# Patient Record
Sex: MALE | Race: WHITE | Employment: OTHER
[De-identification: names, ages, dates, MRNs, and addresses within clinical notes are randomized per-mention and may not be internally consistent; named-entity substitution may affect disease eponyms.]

---

## 2017-02-17 ENCOUNTER — OFFICE VISIT (OUTPATIENT)
Dept: CARDIOLOGY | Facility: CLINIC | Age: 62
End: 2017-02-17

## 2017-02-17 VITALS
BODY MASS INDEX: 35.67 KG/M2 | DIASTOLIC BLOOD PRESSURE: 80 MMHG | SYSTOLIC BLOOD PRESSURE: 150 MMHG | OXYGEN SATURATION: 96 % | HEART RATE: 82 BPM | WEIGHT: 263 LBS

## 2017-02-17 DIAGNOSIS — E55.9 VITAMIN D DEFICIENCY DISEASE: ICD-10-CM

## 2017-02-17 DIAGNOSIS — E13.9 DIABETES MELLITUS OF OTHER TYPE WITHOUT COMPLICATION: ICD-10-CM

## 2017-02-17 DIAGNOSIS — I10 ESSENTIAL HYPERTENSION: ICD-10-CM

## 2017-02-17 DIAGNOSIS — I48.20 CHRONIC ATRIAL FIBRILLATION (HCC): Primary | ICD-10-CM

## 2017-02-17 PROCEDURE — 99214 OFFICE O/P EST MOD 30 MIN: CPT | Performed by: INTERNAL MEDICINE

## 2017-02-17 RX ORDER — METOPROLOL TARTRATE 100 MG/1
50 TABLET ORAL 2 TIMES DAILY
Qty: 60 TABLET | Refills: 11 | Status: SHIPPED | OUTPATIENT
Start: 2017-02-17 | End: 2017-06-01

## 2017-05-26 RX ORDER — SPIRONOLACTONE 25 MG/1
25 TABLET ORAL PRN
Qty: 30 TABLET | Refills: 11 | Status: SHIPPED | OUTPATIENT
Start: 2017-05-26 | End: 2017-06-01 | Stop reason: SDUPTHER

## 2017-06-01 ENCOUNTER — OFFICE VISIT (OUTPATIENT)
Dept: CARDIOLOGY CLINIC | Age: 62
End: 2017-06-01
Payer: COMMERCIAL

## 2017-06-01 ENCOUNTER — HOSPITAL ENCOUNTER (OUTPATIENT)
Age: 62
Discharge: HOME | End: 2017-06-01
Payer: COMMERCIAL

## 2017-06-01 VITALS
BODY MASS INDEX: 36.62 KG/M2 | OXYGEN SATURATION: 98 % | DIASTOLIC BLOOD PRESSURE: 90 MMHG | WEIGHT: 270 LBS | HEART RATE: 120 BPM | SYSTOLIC BLOOD PRESSURE: 150 MMHG

## 2017-06-01 DIAGNOSIS — R51.9 FREQUENT HEADACHES: ICD-10-CM

## 2017-06-01 DIAGNOSIS — I10 ESSENTIAL HYPERTENSION: ICD-10-CM

## 2017-06-01 DIAGNOSIS — D50.9 IRON DEFICIENCY ANEMIA, UNSPECIFIED IRON DEFICIENCY ANEMIA TYPE: ICD-10-CM

## 2017-06-01 DIAGNOSIS — E78.5 HYPERLIPIDEMIA, UNSPECIFIED HYPERLIPIDEMIA TYPE: ICD-10-CM

## 2017-06-01 DIAGNOSIS — I48.20 CHRONIC ATRIAL FIBRILLATION (HCC): Primary | ICD-10-CM

## 2017-06-01 DIAGNOSIS — E13.9 DIABETES MELLITUS OF OTHER TYPE WITHOUT COMPLICATION: ICD-10-CM

## 2017-06-01 DIAGNOSIS — I48.20 CHRONIC ATRIAL FIBRILLATION (HCC): ICD-10-CM

## 2017-06-01 LAB
ABSOLUTE EOS #: 0.12 K/UL (ref 0–0.4)
ABSOLUTE LYMPH #: 0.84 K/UL (ref 0.9–2.5)
ABSOLUTE MONO #: 0.42 K/UL (ref 0–1)
ABSOLUTE RETIC #: NORMAL M/UL (ref 0.02–0.12)
BASOPHILS # BLD: ABNORMAL %
BASOPHILS ABSOLUTE: ABNORMAL K/UL (ref 0–0.2)
CELLS COUNTED: 50
DIFFERENTIAL TYPE: ABNORMAL
EOSINOPHILS RELATIVE PERCENT: 4 %
HCT VFR BLD CALC: 29.8 % (ref 41–53)
HEMOGLOBIN: 9.1 G/DL (ref 13.5–17.5)
IRON SATURATION: 4 % (ref 20–55)
IRON: 13 UG/DL (ref 59–158)
LYMPHOCYTES # BLD: 28 %
MCH RBC QN AUTO: 25.1 PG (ref 26–34)
MCHC RBC AUTO-ENTMCNC: 30.7 G/DL (ref 31–37)
MCV RBC AUTO: 81.8 FL (ref 80–100)
METAMYELOCYTES ABSOLUTE COUNT: 0.06 K/UL
METAMYELOCYTES: 2 %
MONOCYTES # BLD: 14 %
MORPHOLOGY: ABNORMAL
PDW BLD-RTO: 18.7 % (ref 12.1–15.2)
PLATELET # BLD: 215 K/UL (ref 140–450)
PLATELET ESTIMATE: ABNORMAL
PMV BLD AUTO: ABNORMAL FL (ref 6–12)
RBC # BLD: 3.64 M/UL (ref 4.5–5.9)
RBC # BLD: ABNORMAL 10*6/UL
RETIC %: 2 % (ref 0.5–2)
SEG NEUTROPHILS: 52 %
SEGMENTED NEUTROPHILS ABSOLUTE COUNT: 1.56 K/UL (ref 2.1–6.5)
TOTAL IRON BINDING CAPACITY: 346 UG/DL (ref 250–450)
UNSATURATED IRON BINDING CAPACITY: 333 UG/DL (ref 112–347)
WBC # BLD: 3 K/UL (ref 3.5–11)
WBC # BLD: ABNORMAL 10*3/UL

## 2017-06-01 PROCEDURE — 99214 OFFICE O/P EST MOD 30 MIN: CPT | Performed by: INTERNAL MEDICINE

## 2017-06-01 PROCEDURE — 83540 ASSAY OF IRON: CPT

## 2017-06-01 PROCEDURE — 83550 IRON BINDING TEST: CPT

## 2017-06-01 PROCEDURE — 85045 AUTOMATED RETICULOCYTE COUNT: CPT

## 2017-06-01 PROCEDURE — 85025 COMPLETE CBC W/AUTO DIFF WBC: CPT

## 2017-06-01 PROCEDURE — 36415 COLL VENOUS BLD VENIPUNCTURE: CPT

## 2017-06-01 RX ORDER — FUROSEMIDE 20 MG/1
20 TABLET ORAL 2 TIMES DAILY
Qty: 60 TABLET | Refills: 3 | Status: SHIPPED | OUTPATIENT
Start: 2017-06-01 | End: 2017-06-01 | Stop reason: CLARIF

## 2017-06-01 RX ORDER — FERROUS SULFATE 325(65) MG
325 TABLET ORAL
COMMUNITY
End: 2018-02-16 | Stop reason: ALTCHOICE

## 2017-06-01 RX ORDER — SPIRONOLACTONE 25 MG/1
25 TABLET ORAL 2 TIMES DAILY
Qty: 60 TABLET | Refills: 11 | Status: SHIPPED | OUTPATIENT
Start: 2017-06-01 | End: 2018-08-13 | Stop reason: SDUPTHER

## 2017-06-01 RX ORDER — FUROSEMIDE 20 MG/1
20 TABLET ORAL 2 TIMES DAILY
COMMUNITY

## 2017-06-01 RX ORDER — DILTIAZEM HYDROCHLORIDE 180 MG/1
180 CAPSULE, COATED, EXTENDED RELEASE ORAL 2 TIMES DAILY
Qty: 30 CAPSULE | Refills: 11 | Status: SHIPPED | OUTPATIENT
Start: 2017-06-01 | End: 2018-03-21 | Stop reason: SDUPTHER

## 2017-06-08 ENCOUNTER — HOSPITAL ENCOUNTER (OUTPATIENT)
Dept: NUCLEAR MEDICINE | Age: 62
Discharge: HOME | End: 2017-06-08
Payer: COMMERCIAL

## 2017-06-08 ENCOUNTER — HOSPITAL ENCOUNTER (OUTPATIENT)
Dept: NON INVASIVE DIAGNOSTICS | Age: 62
Discharge: HOME | End: 2017-06-08
Payer: COMMERCIAL

## 2017-06-08 ENCOUNTER — HOSPITAL ENCOUNTER (OUTPATIENT)
Age: 62
Discharge: HOME | End: 2017-06-08
Payer: COMMERCIAL

## 2017-06-08 ENCOUNTER — HOSPITAL ENCOUNTER (OUTPATIENT)
Dept: CT IMAGING | Age: 62
Discharge: HOME | End: 2017-06-08
Payer: COMMERCIAL

## 2017-06-08 VITALS — SYSTOLIC BLOOD PRESSURE: 164 MMHG | DIASTOLIC BLOOD PRESSURE: 83 MMHG | HEART RATE: 82 BPM

## 2017-06-08 DIAGNOSIS — I48.20 CHRONIC ATRIAL FIBRILLATION (HCC): ICD-10-CM

## 2017-06-08 DIAGNOSIS — E13.9 DIABETES MELLITUS OF OTHER TYPE WITHOUT COMPLICATION: ICD-10-CM

## 2017-06-08 DIAGNOSIS — I10 ESSENTIAL HYPERTENSION: ICD-10-CM

## 2017-06-08 DIAGNOSIS — E78.5 HYPERLIPIDEMIA, UNSPECIFIED HYPERLIPIDEMIA TYPE: ICD-10-CM

## 2017-06-08 DIAGNOSIS — R51.9 FREQUENT HEADACHES: ICD-10-CM

## 2017-06-08 LAB
ABSOLUTE EOS #: 0.1 K/UL (ref 0–0.4)
ABSOLUTE LYMPH #: 0.9 K/UL (ref 0.9–2.5)
ABSOLUTE MONO #: 0.4 K/UL (ref 0–1)
ALBUMIN SERPL-MCNC: 3.9 G/DL (ref 3.5–5.2)
ALBUMIN/GLOBULIN RATIO: ABNORMAL (ref 1–2.5)
ALP BLD-CCNC: 100 U/L (ref 40–129)
ALT SERPL-CCNC: 23 U/L (ref 5–41)
ANION GAP SERPL CALCULATED.3IONS-SCNC: 12 MMOL/L (ref 9–17)
AST SERPL-CCNC: 38 U/L
BASOPHILS # BLD: 0 %
BASOPHILS ABSOLUTE: 0 K/UL (ref 0–0.2)
BILIRUB SERPL-MCNC: 0.69 MG/DL (ref 0.3–1.2)
BUN BLDV-MCNC: 11 MG/DL (ref 6–20)
BUN/CREAT BLD: 12 (ref 9–20)
CALCIUM SERPL-MCNC: 9 MG/DL (ref 8.6–10.4)
CHLORIDE BLD-SCNC: 95 MMOL/L (ref 98–107)
CHOLESTEROL/HDL RATIO: 1.9
CHOLESTEROL: 119 MG/DL
CO2: 26 MMOL/L (ref 20–31)
CREAT SERPL-MCNC: 0.89 MG/DL (ref 0.7–1.2)
DIFFERENTIAL TYPE: ABNORMAL
EOSINOPHILS RELATIVE PERCENT: 3 %
GFR AFRICAN AMERICAN: >60 ML/MIN
GFR NON-AFRICAN AMERICAN: >60 ML/MIN
GFR SERPL CREATININE-BSD FRML MDRD: ABNORMAL ML/MIN/{1.73_M2}
GFR SERPL CREATININE-BSD FRML MDRD: ABNORMAL ML/MIN/{1.73_M2}
GLUCOSE BLD-MCNC: 111 MG/DL (ref 70–99)
HCT VFR BLD CALC: 28.8 % (ref 41–53)
HDLC SERPL-MCNC: 63 MG/DL
HEMOGLOBIN: 8.9 G/DL (ref 13.5–17.5)
LDL CHOLESTEROL: 44 MG/DL (ref 0–130)
LV EF: 50 %
LVEF MODALITY: NORMAL
LYMPHOCYTES # BLD: 23 %
MCH RBC QN AUTO: 24.9 PG (ref 26–34)
MCHC RBC AUTO-ENTMCNC: 31 G/DL (ref 31–37)
MCV RBC AUTO: 80.4 FL (ref 80–100)
MONOCYTES # BLD: 10 %
PATIENT FASTING?: YES
PDW BLD-RTO: 17.8 % (ref 12.1–15.2)
PLATELET # BLD: 257 K/UL (ref 140–450)
PLATELET ESTIMATE: ABNORMAL
PMV BLD AUTO: ABNORMAL FL (ref 6–12)
POTASSIUM SERPL-SCNC: 5.3 MMOL/L (ref 3.7–5.3)
RBC # BLD: 3.59 M/UL (ref 4.5–5.9)
RBC # BLD: ABNORMAL 10*6/UL
SEG NEUTROPHILS: 64 %
SEGMENTED NEUTROPHILS ABSOLUTE COUNT: 2.4 K/UL (ref 2.1–6.5)
SODIUM BLD-SCNC: 133 MMOL/L (ref 135–144)
TOTAL PROTEIN: 7.2 G/DL (ref 6.4–8.3)
TRIGL SERPL-MCNC: 58 MG/DL
VLDLC SERPL CALC-MCNC: NORMAL MG/DL (ref 1–30)
WBC # BLD: 3.7 K/UL (ref 3.5–11)
WBC # BLD: ABNORMAL 10*3/UL

## 2017-06-08 PROCEDURE — 6360000002 HC RX W HCPCS: Performed by: INTERNAL MEDICINE

## 2017-06-08 PROCEDURE — 93306 TTE W/DOPPLER COMPLETE: CPT

## 2017-06-08 PROCEDURE — 80061 LIPID PANEL: CPT

## 2017-06-08 PROCEDURE — A9500 TC99M SESTAMIBI: HCPCS | Performed by: INTERNAL MEDICINE

## 2017-06-08 PROCEDURE — 70450 CT HEAD/BRAIN W/O DYE: CPT

## 2017-06-08 PROCEDURE — 93005 ELECTROCARDIOGRAM TRACING: CPT

## 2017-06-08 PROCEDURE — 36415 COLL VENOUS BLD VENIPUNCTURE: CPT

## 2017-06-08 PROCEDURE — 2580000003 HC RX 258: Performed by: INTERNAL MEDICINE

## 2017-06-08 PROCEDURE — 78452 HT MUSCLE IMAGE SPECT MULT: CPT

## 2017-06-08 PROCEDURE — 3430000000 HC RX DIAGNOSTIC RADIOPHARMACEUTICAL: Performed by: INTERNAL MEDICINE

## 2017-06-08 PROCEDURE — 80053 COMPREHEN METABOLIC PANEL: CPT

## 2017-06-08 PROCEDURE — 93017 CV STRESS TEST TRACING ONLY: CPT

## 2017-06-08 PROCEDURE — 85025 COMPLETE CBC W/AUTO DIFF WBC: CPT

## 2017-06-08 RX ORDER — AMINOPHYLLINE DIHYDRATE 25 MG/ML
100 INJECTION, SOLUTION INTRAVENOUS
Status: ACTIVE | OUTPATIENT
Start: 2017-06-08 | End: 2017-06-08

## 2017-06-08 RX ORDER — AMINOPHYLLINE DIHYDRATE 25 MG/ML
50 INJECTION, SOLUTION INTRAVENOUS
Status: ACTIVE | OUTPATIENT
Start: 2017-06-08 | End: 2017-06-08

## 2017-06-08 RX ORDER — 0.9 % SODIUM CHLORIDE 0.9 %
10 VIAL (ML) INJECTION PRN
Status: DISCONTINUED | OUTPATIENT
Start: 2017-06-08 | End: 2017-06-09 | Stop reason: HOSPADM

## 2017-06-08 RX ADMIN — TETRAKIS(2-METHOXYISOBUTYLISOCYANIDE)COPPER(I) TETRAFLUOROBORATE 31.3 MILLICURIE: 1 INJECTION, POWDER, LYOPHILIZED, FOR SOLUTION INTRAVENOUS at 11:13

## 2017-06-08 RX ADMIN — REGADENOSON 0.4 MG: 0.08 INJECTION, SOLUTION INTRAVENOUS at 11:13

## 2017-06-08 RX ADMIN — Medication 10 ML: at 11:13

## 2017-06-08 RX ADMIN — TETRAKIS(2-METHOXYISOBUTYLISOCYANIDE)COPPER(I) TETRAFLUOROBORATE 10.8 MILLICURIE: 1 INJECTION, POWDER, LYOPHILIZED, FOR SOLUTION INTRAVENOUS at 10:10

## 2017-06-12 ENCOUNTER — OFFICE VISIT (OUTPATIENT)
Dept: CARDIOLOGY CLINIC | Age: 62
End: 2017-06-12
Payer: COMMERCIAL

## 2017-06-12 VITALS
BODY MASS INDEX: 36.62 KG/M2 | SYSTOLIC BLOOD PRESSURE: 130 MMHG | DIASTOLIC BLOOD PRESSURE: 70 MMHG | HEART RATE: 100 BPM | WEIGHT: 270 LBS | OXYGEN SATURATION: 99 %

## 2017-06-12 DIAGNOSIS — D50.9 IRON DEFICIENCY ANEMIA, UNSPECIFIED IRON DEFICIENCY ANEMIA TYPE: ICD-10-CM

## 2017-06-12 DIAGNOSIS — I48.20 CHRONIC ATRIAL FIBRILLATION (HCC): Primary | ICD-10-CM

## 2017-06-12 DIAGNOSIS — I10 ESSENTIAL HYPERTENSION: ICD-10-CM

## 2017-06-12 DIAGNOSIS — E55.9 VITAMIN D DEFICIENCY DISEASE: ICD-10-CM

## 2017-06-12 LAB
EKG ATRIAL RATE: 91 BPM
EKG Q-T INTERVAL: 366 MS
EKG QRS DURATION: 94 MS
EKG QTC CALCULATION (BAZETT): 432 MS
EKG R AXIS: 54 DEGREES
EKG T AXIS: -60 DEGREES
EKG VENTRICULAR RATE: 84 BPM

## 2017-06-12 PROCEDURE — 99213 OFFICE O/P EST LOW 20 MIN: CPT | Performed by: INTERNAL MEDICINE

## 2017-06-12 RX ORDER — ISOSORBIDE MONONITRATE 30 MG/1
15 TABLET, EXTENDED RELEASE ORAL DAILY
Qty: 30 TABLET | Refills: 11 | Status: SHIPPED | OUTPATIENT
Start: 2017-06-12 | End: 2018-02-16 | Stop reason: ALTCHOICE

## 2017-06-12 RX ORDER — PREDNISONE 20 MG/1
20 TABLET ORAL DAILY
Qty: 6 TABLET | Refills: 0 | Status: SHIPPED | OUTPATIENT
Start: 2017-06-12 | End: 2017-06-14

## 2017-06-13 ENCOUNTER — HOSPITAL ENCOUNTER (OUTPATIENT)
Age: 62
Discharge: HOME | End: 2017-06-13
Payer: COMMERCIAL

## 2017-06-13 DIAGNOSIS — I48.20 CHRONIC ATRIAL FIBRILLATION (HCC): ICD-10-CM

## 2017-06-13 DIAGNOSIS — D64.9 LOW HEMOGLOBIN: Primary | ICD-10-CM

## 2017-06-13 DIAGNOSIS — D50.9 IRON DEFICIENCY ANEMIA, UNSPECIFIED IRON DEFICIENCY ANEMIA TYPE: ICD-10-CM

## 2017-06-13 DIAGNOSIS — I10 ESSENTIAL HYPERTENSION: ICD-10-CM

## 2017-06-13 DIAGNOSIS — D50.9 IRON DEFICIENCY ANEMIA, UNSPECIFIED IRON DEFICIENCY ANEMIA TYPE: Primary | ICD-10-CM

## 2017-06-13 DIAGNOSIS — E55.9 VITAMIN D DEFICIENCY DISEASE: ICD-10-CM

## 2017-06-13 DIAGNOSIS — E13.9 DIABETES MELLITUS OF OTHER TYPE WITHOUT COMPLICATION: ICD-10-CM

## 2017-06-13 LAB
BILIRUBIN URINE: NEGATIVE
CHOLESTEROL/HDL RATIO: 2
CHOLESTEROL: 119 MG/DL
COLOR: YELLOW
COMMENT UA: NORMAL
FOLATE: 6.4 NG/ML
GLUCOSE URINE: NEGATIVE
HCT VFR BLD CALC: 29.9 % (ref 41–53)
HDLC SERPL-MCNC: 60 MG/DL
HEMOGLOBIN: 9.4 G/DL (ref 13.5–17.5)
IRON SATURATION: 5 % (ref 20–55)
IRON: 17 UG/DL (ref 59–158)
KETONES, URINE: NEGATIVE
LDL CHOLESTEROL: 47 MG/DL (ref 0–130)
LEUKOCYTE ESTERASE, URINE: NEGATIVE
NITRITE, URINE: NEGATIVE
PATIENT FASTING?: YES
PH UA: 6 (ref 5–8)
PROTEIN UA: NEGATIVE
SPECIFIC GRAVITY UA: 1.01 (ref 1–1.03)
TOTAL IRON BINDING CAPACITY: 338 UG/DL (ref 250–450)
TRIGL SERPL-MCNC: 60 MG/DL
TURBIDITY: CLEAR
UNSATURATED IRON BINDING CAPACITY: 321 UG/DL (ref 112–347)
URINE HGB: NEGATIVE
UROBILINOGEN, URINE: NORMAL
VITAMIN B-12: 361 PG/ML (ref 211–946)
VLDLC SERPL CALC-MCNC: NORMAL MG/DL (ref 1–30)

## 2017-06-13 PROCEDURE — 80061 LIPID PANEL: CPT

## 2017-06-13 PROCEDURE — 82607 VITAMIN B-12: CPT

## 2017-06-13 PROCEDURE — 83540 ASSAY OF IRON: CPT

## 2017-06-13 PROCEDURE — 82746 ASSAY OF FOLIC ACID SERUM: CPT

## 2017-06-13 PROCEDURE — 36415 COLL VENOUS BLD VENIPUNCTURE: CPT

## 2017-06-13 PROCEDURE — 85014 HEMATOCRIT: CPT

## 2017-06-13 PROCEDURE — 86255 FLUORESCENT ANTIBODY SCREEN: CPT

## 2017-06-13 PROCEDURE — 85018 HEMOGLOBIN: CPT

## 2017-06-13 PROCEDURE — 81003 URINALYSIS AUTO W/O SCOPE: CPT

## 2017-06-13 PROCEDURE — 83550 IRON BINDING TEST: CPT

## 2017-06-14 ENCOUNTER — TELEPHONE (OUTPATIENT)
Dept: CARDIOLOGY CLINIC | Age: 62
End: 2017-06-14

## 2017-06-15 ENCOUNTER — HOSPITAL ENCOUNTER (OUTPATIENT)
Age: 62
Setting detail: SPECIMEN
Discharge: HOME | End: 2017-06-15
Payer: COMMERCIAL

## 2017-06-15 DIAGNOSIS — D64.9 LOW HEMOGLOBIN: ICD-10-CM

## 2017-06-15 LAB
DATE, STOOL #1: NORMAL
DATE, STOOL #2: NORMAL
DATE, STOOL #3: NORMAL
HEMOCCULT SP1 STL QL: NEGATIVE
HEMOCCULT SP2 STL QL: NORMAL
HEMOCCULT SP3 STL QL: NORMAL
Lab: NORMAL
Lab: NORMAL
TIME, STOOL #1: 1045
TIME, STOOL #2: NORMAL
TIME, STOOL #3: NORMAL

## 2017-06-15 PROCEDURE — 82272 OCCULT BLD FECES 1-3 TESTS: CPT

## 2017-06-21 ENCOUNTER — NURSE ONLY (OUTPATIENT)
Dept: CARDIOLOGY CLINIC | Age: 62
End: 2017-06-21

## 2017-06-21 DIAGNOSIS — I20.9 ANGINA, CLASS III (HCC): Primary | ICD-10-CM

## 2017-06-22 ENCOUNTER — TELEPHONE (OUTPATIENT)
Dept: CARDIOLOGY CLINIC | Age: 62
End: 2017-06-22

## 2017-06-23 ENCOUNTER — HOSPITAL ENCOUNTER (OUTPATIENT)
Age: 62
Discharge: HOME | End: 2017-06-23
Payer: COMMERCIAL

## 2017-06-23 ENCOUNTER — TELEPHONE (OUTPATIENT)
Dept: CARDIOLOGY CLINIC | Age: 62
End: 2017-06-23

## 2017-06-23 DIAGNOSIS — I10 ESSENTIAL HYPERTENSION: ICD-10-CM

## 2017-06-23 LAB
BUN BLDV-MCNC: 12 MG/DL (ref 6–20)
CREAT SERPL-MCNC: 0.79 MG/DL (ref 0.7–1.2)
GFR AFRICAN AMERICAN: >60 ML/MIN
GFR NON-AFRICAN AMERICAN: >60 ML/MIN
GFR SERPL CREATININE-BSD FRML MDRD: NORMAL ML/MIN/{1.73_M2}
GFR SERPL CREATININE-BSD FRML MDRD: NORMAL ML/MIN/{1.73_M2}

## 2017-06-23 PROCEDURE — 84520 ASSAY OF UREA NITROGEN: CPT

## 2017-06-23 PROCEDURE — 82565 ASSAY OF CREATININE: CPT

## 2017-06-23 PROCEDURE — 36415 COLL VENOUS BLD VENIPUNCTURE: CPT

## 2017-06-26 ENCOUNTER — HOSPITAL ENCOUNTER (OUTPATIENT)
Dept: ULTRASOUND IMAGING | Age: 62
Discharge: HOME | End: 2017-06-26
Payer: COMMERCIAL

## 2017-06-26 ENCOUNTER — NURSE ONLY (OUTPATIENT)
Dept: CARDIOLOGY CLINIC | Age: 62
End: 2017-06-26

## 2017-06-26 DIAGNOSIS — I72.9 PSEUDOANEURYSM (HCC): Primary | ICD-10-CM

## 2017-06-26 DIAGNOSIS — I72.9 PSEUDOANEURYSM (HCC): ICD-10-CM

## 2017-06-26 PROCEDURE — 76881 US COMPL JOINT R-T W/IMG: CPT

## 2017-07-11 ENCOUNTER — HOSPITAL ENCOUNTER (OUTPATIENT)
Dept: CARDIAC REHAB | Age: 62
Setting detail: THERAPIES SERIES
Discharge: HOME | End: 2017-07-11
Payer: COMMERCIAL

## 2017-07-11 VITALS — HEIGHT: 72 IN

## 2017-07-12 ENCOUNTER — HOSPITAL ENCOUNTER (OUTPATIENT)
Dept: CARDIAC REHAB | Age: 62
Setting detail: THERAPIES SERIES
Discharge: HOME | End: 2017-07-12
Payer: COMMERCIAL

## 2017-07-12 VITALS — HEIGHT: 72 IN | BODY MASS INDEX: 37.71 KG/M2 | WEIGHT: 278.4 LBS

## 2017-07-12 PROCEDURE — 93798 PHYS/QHP OP CAR RHAB W/ECG: CPT

## 2017-07-19 ENCOUNTER — HOSPITAL ENCOUNTER (OUTPATIENT)
Dept: CARDIAC REHAB | Age: 62
Setting detail: THERAPIES SERIES
Discharge: HOME | End: 2017-07-19
Payer: COMMERCIAL

## 2017-07-24 ENCOUNTER — HOSPITAL ENCOUNTER (OUTPATIENT)
Dept: CARDIAC REHAB | Age: 62
Discharge: HOME | End: 2017-07-24

## 2017-07-26 ENCOUNTER — HOSPITAL ENCOUNTER (OUTPATIENT)
Dept: CARDIAC REHAB | Age: 62
Setting detail: THERAPIES SERIES
Discharge: HOME | End: 2017-07-26
Payer: COMMERCIAL

## 2017-07-28 ENCOUNTER — HOSPITAL ENCOUNTER (OUTPATIENT)
Dept: CARDIAC REHAB | Age: 62
Setting detail: THERAPIES SERIES
Discharge: HOME | End: 2017-07-28
Payer: COMMERCIAL

## 2017-07-31 ENCOUNTER — HOSPITAL ENCOUNTER (OUTPATIENT)
Dept: CARDIAC REHAB | Age: 62
Setting detail: THERAPIES SERIES
Discharge: HOME | End: 2017-07-31
Payer: COMMERCIAL

## 2017-08-03 ENCOUNTER — HOSPITAL ENCOUNTER (OUTPATIENT)
Dept: CARDIAC REHAB | Age: 62
Setting detail: THERAPIES SERIES
Discharge: HOME | End: 2017-08-03
Payer: COMMERCIAL

## 2017-10-19 DIAGNOSIS — D50.9 IRON DEFICIENCY ANEMIA, UNSPECIFIED IRON DEFICIENCY ANEMIA TYPE: Primary | ICD-10-CM

## 2017-10-21 ENCOUNTER — HOSPITAL ENCOUNTER (OUTPATIENT)
Age: 62
Discharge: HOME | End: 2017-10-21
Payer: COMMERCIAL

## 2017-10-21 DIAGNOSIS — D50.9 IRON DEFICIENCY ANEMIA, UNSPECIFIED IRON DEFICIENCY ANEMIA TYPE: ICD-10-CM

## 2017-10-21 LAB
ABSOLUTE EOS #: 0.13 K/UL (ref 0–0.4)
ABSOLUTE IMMATURE GRANULOCYTE: ABNORMAL K/UL (ref 0–0.3)
ABSOLUTE LYMPH #: 1.02 K/UL (ref 1–4.8)
ABSOLUTE MONO #: 0.36 K/UL (ref 0–1)
ABSOLUTE RETIC #: 0.1 M/UL (ref 0.02–0.12)
BASOPHILS # BLD: ABNORMAL %
BASOPHILS ABSOLUTE: ABNORMAL K/UL (ref 0–0.2)
DIFFERENTIAL TYPE: ABNORMAL
EOSINOPHILS RELATIVE PERCENT: 4 %
FOLATE: 4.6 NG/ML
HCT VFR BLD CALC: 26.8 % (ref 41–53)
HEMOGLOBIN: 8.1 G/DL (ref 13.5–17.5)
IMMATURE GRANULOCYTES: ABNORMAL %
IRON SATURATION: 7 % (ref 20–55)
IRON: 24 UG/DL (ref 59–158)
LYMPHOCYTES # BLD: 31 %
MCH RBC QN AUTO: 22.1 PG (ref 26–34)
MCHC RBC AUTO-ENTMCNC: 30.2 G/DL (ref 31–37)
MCV RBC AUTO: 73.3 FL (ref 80–100)
MONOCYTES # BLD: 11 %
MORPHOLOGY: ABNORMAL
PDW BLD-RTO: 21.3 % (ref 12.1–15.2)
PLATELET # BLD: 192 K/UL (ref 140–450)
PLATELET ESTIMATE: ABNORMAL
PMV BLD AUTO: ABNORMAL FL (ref 6–12)
RBC # BLD: 3.66 M/UL (ref 4.5–5.9)
RBC # BLD: ABNORMAL 10*6/UL
RETIC %: 2.7 % (ref 0.5–2)
RETIC HEMOGLOBIN: ABNORMAL PG (ref 28.2–35.7)
SEG NEUTROPHILS: 54 %
SEGMENTED NEUTROPHILS ABSOLUTE COUNT: 1.79 K/UL (ref 2.1–6.5)
TOTAL IRON BINDING CAPACITY: 334 UG/DL (ref 250–450)
UNSATURATED IRON BINDING CAPACITY: 310 UG/DL (ref 112–347)
VITAMIN B-12: 367 PG/ML (ref 211–946)
WBC # BLD: 3.3 K/UL (ref 3.5–11)
WBC # BLD: ABNORMAL 10*3/UL

## 2017-10-21 PROCEDURE — 36415 COLL VENOUS BLD VENIPUNCTURE: CPT

## 2017-10-21 PROCEDURE — 82746 ASSAY OF FOLIC ACID SERUM: CPT

## 2017-10-21 PROCEDURE — 85045 AUTOMATED RETICULOCYTE COUNT: CPT

## 2017-10-21 PROCEDURE — 85025 COMPLETE CBC W/AUTO DIFF WBC: CPT

## 2017-10-21 PROCEDURE — 83550 IRON BINDING TEST: CPT

## 2017-10-21 PROCEDURE — 82607 VITAMIN B-12: CPT

## 2017-10-21 PROCEDURE — 83540 ASSAY OF IRON: CPT

## 2017-10-24 ENCOUNTER — TELEPHONE (OUTPATIENT)
Dept: CARDIOLOGY CLINIC | Age: 62
End: 2017-10-24

## 2017-10-24 DIAGNOSIS — D50.9 IRON DEFICIENCY ANEMIA, UNSPECIFIED IRON DEFICIENCY ANEMIA TYPE: Primary | ICD-10-CM

## 2017-12-02 ENCOUNTER — HOSPITAL ENCOUNTER (OUTPATIENT)
Age: 62
Discharge: HOME | End: 2017-12-02
Payer: COMMERCIAL

## 2017-12-02 DIAGNOSIS — D50.9 IRON DEFICIENCY ANEMIA, UNSPECIFIED IRON DEFICIENCY ANEMIA TYPE: ICD-10-CM

## 2017-12-02 LAB
ABSOLUTE EOS #: 0.1 K/UL (ref 0–0.4)
ABSOLUTE IMMATURE GRANULOCYTE: ABNORMAL K/UL (ref 0–0.3)
ABSOLUTE LYMPH #: 0.7 K/UL (ref 1–4.8)
ABSOLUTE MONO #: 0.4 K/UL (ref 0–1)
BASOPHILS # BLD: 1 % (ref 0–2)
BASOPHILS ABSOLUTE: 0 K/UL (ref 0–0.2)
DIFFERENTIAL TYPE: ABNORMAL
EOSINOPHILS RELATIVE PERCENT: 2 % (ref 0–5)
HCT VFR BLD CALC: 32 % (ref 41–53)
HEMOGLOBIN: 10.4 G/DL (ref 13.5–17.5)
IMMATURE GRANULOCYTES: ABNORMAL %
LYMPHOCYTES # BLD: 21 % (ref 13–44)
MCH RBC QN AUTO: 28.4 PG (ref 26–34)
MCHC RBC AUTO-ENTMCNC: 32.4 G/DL (ref 31–37)
MCV RBC AUTO: 87.6 FL (ref 80–100)
MONOCYTES # BLD: 11 % (ref 5–9)
MORPHOLOGY: ABNORMAL
PDW BLD-RTO: 27.7 % (ref 12.1–15.2)
PLATELET # BLD: 207 K/UL (ref 140–450)
PLATELET ESTIMATE: ABNORMAL
PMV BLD AUTO: ABNORMAL FL (ref 6–12)
RBC # BLD: 3.66 M/UL (ref 4.5–5.9)
RBC # BLD: ABNORMAL 10*6/UL
SEG NEUTROPHILS: 65 % (ref 39–75)
SEGMENTED NEUTROPHILS ABSOLUTE COUNT: 2.3 K/UL (ref 2.1–6.5)
WBC # BLD: 3.5 K/UL (ref 3.5–11)
WBC # BLD: ABNORMAL 10*3/UL

## 2017-12-02 PROCEDURE — 36415 COLL VENOUS BLD VENIPUNCTURE: CPT

## 2017-12-02 PROCEDURE — 85025 COMPLETE CBC W/AUTO DIFF WBC: CPT

## 2017-12-04 ENCOUNTER — TELEPHONE (OUTPATIENT)
Dept: CARDIOLOGY CLINIC | Age: 62
End: 2017-12-04

## 2017-12-04 NOTE — TELEPHONE ENCOUNTER
Pt called with labs   Cont taking iron   Hemoglobin improved  Will follow up with the [facility] [place] [facility]

## 2018-01-29 RX ORDER — RIVAROXABAN 20 MG/1
20 TABLET, FILM COATED ORAL DAILY
Qty: 30 TABLET | Refills: 11 | Status: SHIPPED | OUTPATIENT
Start: 2018-01-29 | End: 2018-01-29 | Stop reason: SDUPTHER

## 2018-02-15 ENCOUNTER — HOSPITAL ENCOUNTER (OUTPATIENT)
Age: 63
Discharge: HOME | End: 2018-02-15
Payer: COMMERCIAL

## 2018-02-15 ENCOUNTER — HOSPITAL ENCOUNTER (OUTPATIENT)
Age: 63
Discharge: HOME | End: 2018-02-17
Payer: COMMERCIAL

## 2018-02-15 ENCOUNTER — HOSPITAL ENCOUNTER (OUTPATIENT)
Dept: GENERAL RADIOLOGY | Age: 63
Discharge: HOME | End: 2018-02-17
Payer: COMMERCIAL

## 2018-02-15 DIAGNOSIS — E55.9 VITAMIN D DEFICIENCY DISEASE: ICD-10-CM

## 2018-02-15 DIAGNOSIS — I48.20 CHRONIC ATRIAL FIBRILLATION (HCC): ICD-10-CM

## 2018-02-15 DIAGNOSIS — E11.9 DIABETES MELLITUS WITHOUT COMPLICATION (HCC): ICD-10-CM

## 2018-02-15 DIAGNOSIS — I10 ESSENTIAL HYPERTENSION: ICD-10-CM

## 2018-02-15 LAB
ABSOLUTE EOS #: 0.05 K/UL (ref 0–0.4)
ABSOLUTE IMMATURE GRANULOCYTE: ABNORMAL K/UL (ref 0–0.3)
ABSOLUTE LYMPH #: 0.67 K/UL (ref 1–4.8)
ABSOLUTE MONO #: 0.29 K/UL (ref 0–1)
ALBUMIN SERPL-MCNC: 3.6 G/DL (ref 3.5–5.2)
ALBUMIN/GLOBULIN RATIO: ABNORMAL (ref 1–2.5)
ALP BLD-CCNC: 103 U/L (ref 40–129)
ALT SERPL-CCNC: 28 U/L (ref 5–41)
ANION GAP SERPL CALCULATED.3IONS-SCNC: 11 MMOL/L (ref 9–17)
AST SERPL-CCNC: 56 U/L
BASOPHILS # BLD: 1 % (ref 0–2)
BASOPHILS ABSOLUTE: 0.02 K/UL (ref 0–0.2)
BILIRUB SERPL-MCNC: 0.69 MG/DL (ref 0.3–1.2)
BUN BLDV-MCNC: 7 MG/DL (ref 8–23)
BUN/CREAT BLD: 11 (ref 9–20)
CALCIUM SERPL-MCNC: 8.7 MG/DL (ref 8.6–10.4)
CHLORIDE BLD-SCNC: 103 MMOL/L (ref 98–107)
CO2: 26 MMOL/L (ref 20–31)
CREAT SERPL-MCNC: 0.64 MG/DL (ref 0.7–1.2)
DIFFERENTIAL TYPE: ABNORMAL
EKG ATRIAL RATE: 98 BPM
EKG Q-T INTERVAL: 332 MS
EKG QRS DURATION: 90 MS
EKG QTC CALCULATION (BAZETT): 447 MS
EKG R AXIS: 14 DEGREES
EKG T AXIS: 90 DEGREES
EKG VENTRICULAR RATE: 109 BPM
EOSINOPHILS RELATIVE PERCENT: 2 % (ref 0–5)
GFR AFRICAN AMERICAN: >60 ML/MIN
GFR NON-AFRICAN AMERICAN: >60 ML/MIN
GFR SERPL CREATININE-BSD FRML MDRD: ABNORMAL ML/MIN/{1.73_M2}
GFR SERPL CREATININE-BSD FRML MDRD: ABNORMAL ML/MIN/{1.73_M2}
GLUCOSE BLD-MCNC: 113 MG/DL (ref 70–99)
HCT VFR BLD CALC: 28 % (ref 41–53)
HEMOGLOBIN: 8.7 G/DL (ref 13.5–17.5)
IMMATURE GRANULOCYTES: ABNORMAL %
LYMPHOCYTES # BLD: 28 % (ref 13–44)
MAGNESIUM: 1.3 MG/DL (ref 1.6–2.6)
MCH RBC QN AUTO: 25.3 PG (ref 26–34)
MCHC RBC AUTO-ENTMCNC: 31 G/DL (ref 31–37)
MCV RBC AUTO: 81.6 FL (ref 80–100)
MONOCYTES # BLD: 12 % (ref 5–9)
MORPHOLOGY: ABNORMAL
NRBC AUTOMATED: ABNORMAL PER 100 WBC
PATIENT FASTING?: YES
PDW BLD-RTO: 17.1 % (ref 12.1–15.2)
PLATELET # BLD: 224 K/UL (ref 140–450)
PLATELET ESTIMATE: ABNORMAL
PMV BLD AUTO: ABNORMAL FL (ref 6–12)
POTASSIUM SERPL-SCNC: 5.3 MMOL/L (ref 3.7–5.3)
RBC # BLD: 3.43 M/UL (ref 4.5–5.9)
RBC # BLD: ABNORMAL 10*6/UL
SEG NEUTROPHILS: 57 % (ref 39–75)
SEGMENTED NEUTROPHILS ABSOLUTE COUNT: 1.37 K/UL (ref 2.1–6.5)
SODIUM BLD-SCNC: 140 MMOL/L (ref 135–144)
TOTAL PROTEIN: 6.9 G/DL (ref 6.4–8.3)
TSH SERPL DL<=0.05 MIU/L-ACNC: 1.24 MIU/L (ref 0.3–5)
VITAMIN D 25-HYDROXY: 23 NG/ML (ref 30–100)
WBC # BLD: 2.4 K/UL (ref 3.5–11)
WBC # BLD: ABNORMAL 10*3/UL

## 2018-02-15 PROCEDURE — 93005 ELECTROCARDIOGRAM TRACING: CPT

## 2018-02-15 PROCEDURE — 71046 X-RAY EXAM CHEST 2 VIEWS: CPT

## 2018-02-15 PROCEDURE — 80053 COMPREHEN METABOLIC PANEL: CPT

## 2018-02-15 PROCEDURE — 36415 COLL VENOUS BLD VENIPUNCTURE: CPT

## 2018-02-15 PROCEDURE — 82306 VITAMIN D 25 HYDROXY: CPT

## 2018-02-15 PROCEDURE — 84443 ASSAY THYROID STIM HORMONE: CPT

## 2018-02-15 PROCEDURE — 85025 COMPLETE CBC W/AUTO DIFF WBC: CPT

## 2018-02-15 PROCEDURE — 83735 ASSAY OF MAGNESIUM: CPT

## 2018-02-16 ENCOUNTER — OFFICE VISIT (OUTPATIENT)
Dept: CARDIOLOGY CLINIC | Age: 63
End: 2018-02-16
Payer: COMMERCIAL

## 2018-02-16 VITALS
BODY MASS INDEX: 37.7 KG/M2 | WEIGHT: 278 LBS | DIASTOLIC BLOOD PRESSURE: 80 MMHG | OXYGEN SATURATION: 98 % | HEART RATE: 89 BPM | SYSTOLIC BLOOD PRESSURE: 140 MMHG

## 2018-02-16 DIAGNOSIS — E55.9 VITAMIN D DEFICIENCY DISEASE: ICD-10-CM

## 2018-02-16 DIAGNOSIS — N91.2 AMENIA: Primary | ICD-10-CM

## 2018-02-16 DIAGNOSIS — E13.9 OTHER SPECIFIED DIABETES MELLITUS WITHOUT COMPLICATION, WITHOUT LONG-TERM CURRENT USE OF INSULIN (HCC): ICD-10-CM

## 2018-02-16 DIAGNOSIS — I10 ESSENTIAL HYPERTENSION: ICD-10-CM

## 2018-02-16 DIAGNOSIS — I48.20 CHRONIC ATRIAL FIBRILLATION (HCC): ICD-10-CM

## 2018-02-16 PROCEDURE — G8427 DOCREV CUR MEDS BY ELIG CLIN: HCPCS | Performed by: INTERNAL MEDICINE

## 2018-02-16 PROCEDURE — 3046F HEMOGLOBIN A1C LEVEL >9.0%: CPT | Performed by: INTERNAL MEDICINE

## 2018-02-16 PROCEDURE — G8484 FLU IMMUNIZE NO ADMIN: HCPCS | Performed by: INTERNAL MEDICINE

## 2018-02-16 PROCEDURE — 3017F COLORECTAL CA SCREEN DOC REV: CPT | Performed by: INTERNAL MEDICINE

## 2018-02-16 PROCEDURE — G8417 CALC BMI ABV UP PARAM F/U: HCPCS | Performed by: INTERNAL MEDICINE

## 2018-02-16 PROCEDURE — 99214 OFFICE O/P EST MOD 30 MIN: CPT | Performed by: INTERNAL MEDICINE

## 2018-02-16 PROCEDURE — 1036F TOBACCO NON-USER: CPT | Performed by: INTERNAL MEDICINE

## 2018-02-16 NOTE — PROGRESS NOTES
Ov Dr. Karen Hall for one year follow up  Can not take metoprolol makes him   Very sick and jittery and sob   Has taken it for months   Makes heart feel like going to jump   Out of chest   On Iron tablet from [address] [facility] doesn't   Know that name of it   Doesn't know what bp is running  No hospitalizations  Procedures  Feb 22 MRI of liver thru [address] [employer] [facility]   edg was cancelled he doesn't know why   Was told MRI would be better  Just getting over the flu   No chest pain heaviness or palpitations  C/o occ dizziness   Near syncope when in a fib  He feels a fib is worse  When in a fib wipes him out   No edema  Energy level is low  Never saw hemologist at [address] [facility]  Will bring medications in  No change right now  Follow up in one year

## 2018-02-20 ENCOUNTER — HOSPITAL ENCOUNTER (OUTPATIENT)
Age: 63
Discharge: HOME | End: 2018-02-20
Payer: COMMERCIAL

## 2018-02-20 DIAGNOSIS — N91.2 AMENIA: ICD-10-CM

## 2018-02-20 LAB
FERRITIN: 29 UG/L (ref 30–400)
FOLATE: 3.9 NG/ML
IRON SATURATION: 8 % (ref 20–55)
IRON: 25 UG/DL (ref 59–158)
TOTAL IRON BINDING CAPACITY: 329 UG/DL (ref 250–450)
UNSATURATED IRON BINDING CAPACITY: 304 UG/DL (ref 112–347)

## 2018-02-20 PROCEDURE — 82728 ASSAY OF FERRITIN: CPT

## 2018-02-20 PROCEDURE — 83550 IRON BINDING TEST: CPT

## 2018-02-20 PROCEDURE — 36415 COLL VENOUS BLD VENIPUNCTURE: CPT

## 2018-02-20 PROCEDURE — 82746 ASSAY OF FOLIC ACID SERUM: CPT

## 2018-02-20 PROCEDURE — 83540 ASSAY OF IRON: CPT

## 2018-03-21 RX ORDER — DILTIAZEM HYDROCHLORIDE 180 MG/1
180 CAPSULE, COATED, EXTENDED RELEASE ORAL 2 TIMES DAILY
Qty: 30 CAPSULE | Refills: 3 | Status: SHIPPED | OUTPATIENT
Start: 2018-03-21 | End: 2019-07-09 | Stop reason: CLARIF

## 2018-03-21 RX ORDER — DIGOXIN 250 MCG
250 TABLET ORAL DAILY
Qty: 30 TABLET | Refills: 3 | Status: SHIPPED | OUTPATIENT
Start: 2018-03-21 | End: 2018-07-20 | Stop reason: SDUPTHER

## 2018-03-21 RX ORDER — LANOLIN ALCOHOL/MO/W.PET/CERES
325 CREAM (GRAM) TOPICAL 2 TIMES DAILY
Status: ON HOLD | COMMUNITY
End: 2019-05-15 | Stop reason: ALTCHOICE

## 2018-07-05 RX ORDER — ISOSORBIDE MONONITRATE 30 MG/1
15 TABLET, EXTENDED RELEASE ORAL DAILY
Qty: 30 TABLET | Refills: 11 | Status: SHIPPED | OUTPATIENT
Start: 2018-07-05 | End: 2019-07-09 | Stop reason: SINTOL

## 2018-07-20 RX ORDER — DIGOXIN 250 MCG
250 TABLET ORAL DAILY
Qty: 30 TABLET | Refills: 11 | Status: SHIPPED | OUTPATIENT
Start: 2018-07-20 | End: 2019-05-06 | Stop reason: SINTOL

## 2018-08-13 RX ORDER — FUROSEMIDE 20 MG/1
TABLET ORAL
Qty: 60 TABLET | Refills: 11 | Status: SHIPPED | OUTPATIENT
Start: 2018-08-13 | End: 2019-05-10 | Stop reason: CLARIF

## 2018-08-13 RX ORDER — SPIRONOLACTONE 25 MG/1
TABLET ORAL
Qty: 60 TABLET | Refills: 11 | Status: SHIPPED | OUTPATIENT
Start: 2018-08-13 | End: 2019-05-10 | Stop reason: CLARIF

## 2018-08-13 RX ORDER — DILTIAZEM HYDROCHLORIDE 180 MG/1
CAPSULE, EXTENDED RELEASE ORAL
Qty: 60 CAPSULE | Refills: 11 | Status: SHIPPED | OUTPATIENT
Start: 2018-08-13 | End: 2019-05-10 | Stop reason: CLARIF

## 2019-04-09 DIAGNOSIS — E55.9 VITAMIN D DEFICIENCY DISEASE: ICD-10-CM

## 2019-04-09 DIAGNOSIS — I10 ESSENTIAL HYPERTENSION: ICD-10-CM

## 2019-04-09 DIAGNOSIS — I48.20 CHRONIC ATRIAL FIBRILLATION (HCC): Primary | ICD-10-CM

## 2019-04-09 DIAGNOSIS — I20.9 ANGINA, CLASS III (HCC): ICD-10-CM

## 2019-04-09 DIAGNOSIS — E78.5 HYPERLIPIDEMIA, UNSPECIFIED HYPERLIPIDEMIA TYPE: ICD-10-CM

## 2019-04-22 RX ORDER — RIVAROXABAN 20 MG/1
TABLET, FILM COATED ORAL
Qty: 30 TABLET | Refills: 11 | Status: ON HOLD | OUTPATIENT
Start: 2019-04-22 | End: 2019-05-15

## 2019-05-06 ENCOUNTER — HOSPITAL ENCOUNTER (OUTPATIENT)
Age: 64
Discharge: HOME | End: 2019-05-06
Payer: COMMERCIAL

## 2019-05-06 ENCOUNTER — HOSPITAL ENCOUNTER (OUTPATIENT)
Dept: GENERAL RADIOLOGY | Age: 64
Discharge: HOME | End: 2019-05-08
Payer: COMMERCIAL

## 2019-05-06 ENCOUNTER — HOSPITAL ENCOUNTER (OUTPATIENT)
Age: 64
Discharge: HOME | End: 2019-05-08
Payer: COMMERCIAL

## 2019-05-06 ENCOUNTER — TELEPHONE (OUTPATIENT)
Dept: CARDIOLOGY CLINIC | Age: 64
End: 2019-05-06

## 2019-05-06 DIAGNOSIS — I20.9 ANGINA, CLASS III (HCC): ICD-10-CM

## 2019-05-06 DIAGNOSIS — I48.20 CHRONIC ATRIAL FIBRILLATION (HCC): ICD-10-CM

## 2019-05-06 DIAGNOSIS — E55.9 VITAMIN D DEFICIENCY DISEASE: ICD-10-CM

## 2019-05-06 DIAGNOSIS — E78.5 HYPERLIPIDEMIA, UNSPECIFIED HYPERLIPIDEMIA TYPE: ICD-10-CM

## 2019-05-06 DIAGNOSIS — I10 ESSENTIAL HYPERTENSION: ICD-10-CM

## 2019-05-06 LAB
ABSOLUTE EOS #: 0.09 K/UL (ref 0–0.4)
ABSOLUTE IMMATURE GRANULOCYTE: ABNORMAL K/UL (ref 0–0.3)
ABSOLUTE LYMPH #: 0.73 K/UL (ref 1–4.8)
ABSOLUTE MONO #: 0.29 K/UL (ref 0–1)
ALBUMIN SERPL-MCNC: 3.7 G/DL (ref 3.5–5.2)
ALBUMIN/GLOBULIN RATIO: ABNORMAL (ref 1–2.5)
ALP BLD-CCNC: 86 U/L (ref 40–129)
ALT SERPL-CCNC: 7 U/L (ref 5–41)
ANION GAP SERPL CALCULATED.3IONS-SCNC: 12 MMOL/L (ref 9–17)
AST SERPL-CCNC: 16 U/L
BASOPHILS # BLD: 1 % (ref 0–2)
BASOPHILS ABSOLUTE: 0.03 K/UL (ref 0–0.2)
BILIRUB SERPL-MCNC: 0.79 MG/DL (ref 0.3–1.2)
BUN BLDV-MCNC: 17 MG/DL (ref 8–23)
BUN/CREAT BLD: 13 (ref 9–20)
CALCIUM SERPL-MCNC: 8.3 MG/DL (ref 8.6–10.4)
CHLORIDE BLD-SCNC: 98 MMOL/L (ref 98–107)
CHOLESTEROL/HDL RATIO: 2.8
CHOLESTEROL: 129 MG/DL
CO2: 23 MMOL/L (ref 20–31)
CREAT SERPL-MCNC: 1.29 MG/DL (ref 0.7–1.2)
DIFFERENTIAL TYPE: ABNORMAL
EOSINOPHILS RELATIVE PERCENT: 3 % (ref 0–5)
GFR AFRICAN AMERICAN: >60 ML/MIN
GFR NON-AFRICAN AMERICAN: 57 ML/MIN
GFR SERPL CREATININE-BSD FRML MDRD: ABNORMAL ML/MIN/{1.73_M2}
GFR SERPL CREATININE-BSD FRML MDRD: ABNORMAL ML/MIN/{1.73_M2}
GLUCOSE BLD-MCNC: 120 MG/DL (ref 70–99)
HCT VFR BLD CALC: 30.5 % (ref 41–53)
HDLC SERPL-MCNC: 46 MG/DL
HEMOGLOBIN: 10.2 G/DL (ref 13.5–17.5)
IMMATURE GRANULOCYTES: ABNORMAL %
LDL CHOLESTEROL: 65 MG/DL (ref 0–130)
LYMPHOCYTES # BLD: 25 % (ref 13–44)
MAGNESIUM: 1.1 MG/DL (ref 1.6–2.6)
MCH RBC QN AUTO: 29.3 PG (ref 26–34)
MCHC RBC AUTO-ENTMCNC: 33.3 G/DL (ref 31–37)
MCV RBC AUTO: 87.9 FL (ref 80–100)
MONOCYTES # BLD: 10 % (ref 5–9)
MORPHOLOGY: ABNORMAL
NRBC AUTOMATED: ABNORMAL PER 100 WBC
PATIENT FASTING?: YES
PDW BLD-RTO: 18 % (ref 12.1–15.2)
PLATELET # BLD: 177 K/UL (ref 140–450)
PLATELET ESTIMATE: ABNORMAL
PMV BLD AUTO: ABNORMAL FL (ref 6–12)
POTASSIUM SERPL-SCNC: 5 MMOL/L (ref 3.7–5.3)
RBC # BLD: 3.47 M/UL (ref 4.5–5.9)
RBC # BLD: ABNORMAL 10*6/UL
SEG NEUTROPHILS: 61 % (ref 39–75)
SEGMENTED NEUTROPHILS ABSOLUTE COUNT: 1.76 K/UL (ref 2.1–6.5)
SODIUM BLD-SCNC: 133 MMOL/L (ref 135–144)
TOTAL PROTEIN: 6.7 G/DL (ref 6.4–8.3)
TRIGL SERPL-MCNC: 88 MG/DL
TSH SERPL DL<=0.05 MIU/L-ACNC: 3.16 MIU/L (ref 0.3–5)
VITAMIN D 25-HYDROXY: 29.2 NG/ML (ref 30–100)
VLDLC SERPL CALC-MCNC: NORMAL MG/DL (ref 1–30)
WBC # BLD: 2.9 K/UL (ref 3.5–11)
WBC # BLD: ABNORMAL 10*3/UL

## 2019-05-06 PROCEDURE — 71046 X-RAY EXAM CHEST 2 VIEWS: CPT

## 2019-05-06 PROCEDURE — 84443 ASSAY THYROID STIM HORMONE: CPT

## 2019-05-06 PROCEDURE — 36415 COLL VENOUS BLD VENIPUNCTURE: CPT

## 2019-05-06 PROCEDURE — 83735 ASSAY OF MAGNESIUM: CPT

## 2019-05-06 PROCEDURE — 80061 LIPID PANEL: CPT

## 2019-05-06 PROCEDURE — 93005 ELECTROCARDIOGRAM TRACING: CPT

## 2019-05-06 PROCEDURE — 85025 COMPLETE CBC W/AUTO DIFF WBC: CPT

## 2019-05-06 PROCEDURE — 82306 VITAMIN D 25 HYDROXY: CPT

## 2019-05-06 PROCEDURE — 80053 COMPREHEN METABOLIC PANEL: CPT

## 2019-05-06 RX ORDER — MAGNESIUM OXIDE 400 MG/1
400 TABLET ORAL DAILY
Qty: 30 TABLET | Refills: 11 | Status: SHIPPED | OUTPATIENT
Start: 2019-05-06 | End: 2021-08-10

## 2019-05-06 NOTE — TELEPHONE ENCOUNTER
Pt called with magnesium level will start maganesium 400mg daily  Pt has  Upcoming appt   And now is going to have back surgery   On 6/19  Pt was recently in [place] for   Bradycardia and hypokalemia    stop digoxin. Will review with pt on Friday appt.

## 2019-05-07 LAB
EKG ATRIAL RATE: 90 BPM
EKG Q-T INTERVAL: 368 MS
EKG QRS DURATION: 94 MS
EKG QTC CALCULATION (BAZETT): 467 MS
EKG R AXIS: -9 DEGREES
EKG T AXIS: 95 DEGREES
EKG VENTRICULAR RATE: 97 BPM

## 2019-05-10 ENCOUNTER — OFFICE VISIT (OUTPATIENT)
Dept: CARDIOLOGY CLINIC | Age: 64
End: 2019-05-10
Payer: COMMERCIAL

## 2019-05-10 ENCOUNTER — HOSPITAL ENCOUNTER (OUTPATIENT)
Dept: NON INVASIVE DIAGNOSTICS | Age: 64
Discharge: HOME | End: 2019-05-10
Payer: COMMERCIAL

## 2019-05-10 VITALS
BODY MASS INDEX: 37.16 KG/M2 | SYSTOLIC BLOOD PRESSURE: 140 MMHG | DIASTOLIC BLOOD PRESSURE: 80 MMHG | WEIGHT: 274 LBS | OXYGEN SATURATION: 96 % | HEART RATE: 100 BPM

## 2019-05-10 DIAGNOSIS — R06.02 SOB (SHORTNESS OF BREATH): ICD-10-CM

## 2019-05-10 DIAGNOSIS — I48.20 CHRONIC ATRIAL FIBRILLATION (HCC): Primary | ICD-10-CM

## 2019-05-10 DIAGNOSIS — I48.20 CHRONIC ATRIAL FIBRILLATION (HCC): ICD-10-CM

## 2019-05-10 LAB
LV EF: 50 %
LVEF MODALITY: NORMAL

## 2019-05-10 PROCEDURE — 3017F COLORECTAL CA SCREEN DOC REV: CPT | Performed by: INTERNAL MEDICINE

## 2019-05-10 PROCEDURE — 99214 OFFICE O/P EST MOD 30 MIN: CPT | Performed by: INTERNAL MEDICINE

## 2019-05-10 PROCEDURE — G8427 DOCREV CUR MEDS BY ELIG CLIN: HCPCS | Performed by: INTERNAL MEDICINE

## 2019-05-10 PROCEDURE — 1036F TOBACCO NON-USER: CPT | Performed by: INTERNAL MEDICINE

## 2019-05-10 PROCEDURE — G8598 ASA/ANTIPLAT THER USED: HCPCS | Performed by: INTERNAL MEDICINE

## 2019-05-10 PROCEDURE — G8419 CALC BMI OUT NRM PARAM NOF/U: HCPCS | Performed by: INTERNAL MEDICINE

## 2019-05-10 PROCEDURE — 93306 TTE W/DOPPLER COMPLETE: CPT

## 2019-05-10 RX ORDER — MECLIZINE HYDROCHLORIDE 25 MG/1
25 TABLET ORAL 3 TIMES DAILY PRN
COMMUNITY
End: 2021-08-10

## 2019-05-10 RX ORDER — PANTOPRAZOLE SODIUM 40 MG/1
40 TABLET, DELAYED RELEASE ORAL 2 TIMES DAILY
COMMUNITY

## 2019-05-14 ENCOUNTER — ANESTHESIA EVENT (OUTPATIENT)
Dept: OPERATING ROOM | Age: 64
End: 2019-05-14
Payer: COMMERCIAL

## 2019-05-15 ENCOUNTER — APPOINTMENT (OUTPATIENT)
Dept: GENERAL RADIOLOGY | Age: 64
End: 2019-05-15
Attending: INTERNAL MEDICINE
Payer: COMMERCIAL

## 2019-05-15 ENCOUNTER — ANESTHESIA (OUTPATIENT)
Dept: OPERATING ROOM | Age: 64
End: 2019-05-15
Payer: COMMERCIAL

## 2019-05-15 ENCOUNTER — HOSPITAL ENCOUNTER (OUTPATIENT)
Age: 64
Discharge: HOME | End: 2019-05-16
Attending: INTERNAL MEDICINE | Admitting: INTERNAL MEDICINE
Payer: COMMERCIAL

## 2019-05-15 VITALS
RESPIRATION RATE: 20 BRPM | SYSTOLIC BLOOD PRESSURE: 131 MMHG | DIASTOLIC BLOOD PRESSURE: 91 MMHG | TEMPERATURE: 97.7 F | OXYGEN SATURATION: 100 %

## 2019-05-15 DIAGNOSIS — Z41.9 SURGERY, ELECTIVE: Primary | ICD-10-CM

## 2019-05-15 PROBLEM — I49.5 SSS (SICK SINUS SYNDROME) (HCC): Status: ACTIVE | Noted: 2019-05-15

## 2019-05-15 LAB — GLUCOSE BLD-MCNC: 119 MG/DL (ref 65–99)

## 2019-05-15 PROCEDURE — 76000 FLUOROSCOPY <1 HR PHYS/QHP: CPT

## 2019-05-15 PROCEDURE — 6360000002 HC RX W HCPCS: Performed by: NURSE ANESTHETIST, CERTIFIED REGISTERED

## 2019-05-15 PROCEDURE — 2580000003 HC RX 258: Performed by: INTERNAL MEDICINE

## 2019-05-15 PROCEDURE — 6360000002 HC RX W HCPCS: Performed by: INTERNAL MEDICINE

## 2019-05-15 PROCEDURE — 7100000001 HC PACU RECOVERY - ADDTL 15 MIN: Performed by: INTERNAL MEDICINE

## 2019-05-15 PROCEDURE — C1786 PMKR, SINGLE, RATE-RESP: HCPCS | Performed by: INTERNAL MEDICINE

## 2019-05-15 PROCEDURE — C1898 LEAD, PMKR, OTHER THAN TRANS: HCPCS | Performed by: INTERNAL MEDICINE

## 2019-05-15 PROCEDURE — 7100000000 HC PACU RECOVERY - FIRST 15 MIN: Performed by: INTERNAL MEDICINE

## 2019-05-15 PROCEDURE — C1894 INTRO/SHEATH, NON-LASER: HCPCS | Performed by: INTERNAL MEDICINE

## 2019-05-15 PROCEDURE — 2500000003 HC RX 250 WO HCPCS: Performed by: INTERNAL MEDICINE

## 2019-05-15 PROCEDURE — 2709999900 HC NON-CHARGEABLE SUPPLY: Performed by: INTERNAL MEDICINE

## 2019-05-15 PROCEDURE — 3700000001 HC ADD 15 MINUTES (ANESTHESIA): Performed by: INTERNAL MEDICINE

## 2019-05-15 PROCEDURE — 71045 X-RAY EXAM CHEST 1 VIEW: CPT

## 2019-05-15 PROCEDURE — 94761 N-INVAS EAR/PLS OXIMETRY MLT: CPT

## 2019-05-15 PROCEDURE — 3600000004 HC SURGERY LEVEL 4 BASE: Performed by: INTERNAL MEDICINE

## 2019-05-15 PROCEDURE — 3700000000 HC ANESTHESIA ATTENDED CARE: Performed by: INTERNAL MEDICINE

## 2019-05-15 PROCEDURE — 3600000014 HC SURGERY LEVEL 4 ADDTL 15MIN: Performed by: INTERNAL MEDICINE

## 2019-05-15 PROCEDURE — 2500000003 HC RX 250 WO HCPCS: Performed by: NURSE ANESTHETIST, CERTIFIED REGISTERED

## 2019-05-15 PROCEDURE — 6370000000 HC RX 637 (ALT 250 FOR IP): Performed by: INTERNAL MEDICINE

## 2019-05-15 PROCEDURE — 33207 INSERT HEART PM VENTRICULAR: CPT | Performed by: INTERNAL MEDICINE

## 2019-05-15 PROCEDURE — 82947 ASSAY GLUCOSE BLOOD QUANT: CPT

## 2019-05-15 DEVICE — PACEMAKER CRD UPLR/BPLR 50.8X42.6X7.4 MM 12.25 CC 22.5 GM: Type: IMPLANTABLE DEVICE | Site: CHEST | Status: FUNCTIONAL

## 2019-05-15 DEVICE — LEAD PACE 5.7FR L52CM VENT SIL PLAT INSUL BPLR STEROID: Type: IMPLANTABLE DEVICE | Site: CHEST | Status: FUNCTIONAL

## 2019-05-15 RX ORDER — SODIUM CHLORIDE 0.9 % (FLUSH) 0.9 %
10 SYRINGE (ML) INJECTION PRN
Status: DISCONTINUED | OUTPATIENT
Start: 2019-05-15 | End: 2019-05-16 | Stop reason: HOSPADM

## 2019-05-15 RX ORDER — ALLOPURINOL 100 MG/1
100 TABLET ORAL DAILY
Status: DISCONTINUED | OUTPATIENT
Start: 2019-05-16 | End: 2019-05-16 | Stop reason: HOSPADM

## 2019-05-15 RX ORDER — ONDANSETRON 2 MG/ML
4 INJECTION INTRAMUSCULAR; INTRAVENOUS EVERY 6 HOURS PRN
Status: DISCONTINUED | OUTPATIENT
Start: 2019-05-15 | End: 2019-05-16 | Stop reason: HOSPADM

## 2019-05-15 RX ORDER — MECLIZINE HCL 12.5 MG/1
25 TABLET ORAL 3 TIMES DAILY PRN
Status: DISCONTINUED | OUTPATIENT
Start: 2019-05-15 | End: 2019-05-16 | Stop reason: HOSPADM

## 2019-05-15 RX ORDER — TIZANIDINE 4 MG/1
4 TABLET ORAL PRN
Refills: 0 | COMMUNITY
Start: 2019-02-23 | End: 2021-08-10

## 2019-05-15 RX ORDER — SODIUM CHLORIDE 9 MG/ML
INJECTION, SOLUTION INTRAVENOUS CONTINUOUS
Status: DISCONTINUED | OUTPATIENT
Start: 2019-05-15 | End: 2019-05-15

## 2019-05-15 RX ORDER — ISOSORBIDE MONONITRATE 30 MG/1
15 TABLET, EXTENDED RELEASE ORAL DAILY
Status: DISCONTINUED | OUTPATIENT
Start: 2019-05-16 | End: 2019-05-16 | Stop reason: HOSPADM

## 2019-05-15 RX ORDER — DEXAMETHASONE SODIUM PHOSPHATE 10 MG/ML
INJECTION INTRAMUSCULAR; INTRAVENOUS PRN
Status: DISCONTINUED | OUTPATIENT
Start: 2019-05-15 | End: 2019-05-15 | Stop reason: SDUPTHER

## 2019-05-15 RX ORDER — LIDOCAINE HYDROCHLORIDE 10 MG/ML
INJECTION, SOLUTION EPIDURAL; INFILTRATION; INTRACAUDAL; PERINEURAL PRN
Status: DISCONTINUED | OUTPATIENT
Start: 2019-05-15 | End: 2019-05-15 | Stop reason: SDUPTHER

## 2019-05-15 RX ORDER — ACETAMINOPHEN 325 MG/1
650 TABLET ORAL EVERY 4 HOURS PRN
Status: DISCONTINUED | OUTPATIENT
Start: 2019-05-15 | End: 2019-05-16 | Stop reason: HOSPADM

## 2019-05-15 RX ORDER — PANTOPRAZOLE SODIUM 40 MG/1
40 TABLET, DELAYED RELEASE ORAL 2 TIMES DAILY
Status: DISCONTINUED | OUTPATIENT
Start: 2019-05-15 | End: 2019-05-16 | Stop reason: HOSPADM

## 2019-05-15 RX ORDER — FENTANYL CITRATE 50 UG/ML
INJECTION, SOLUTION INTRAMUSCULAR; INTRAVENOUS PRN
Status: DISCONTINUED | OUTPATIENT
Start: 2019-05-15 | End: 2019-05-15 | Stop reason: SDUPTHER

## 2019-05-15 RX ORDER — ONDANSETRON 2 MG/ML
INJECTION INTRAMUSCULAR; INTRAVENOUS PRN
Status: DISCONTINUED | OUTPATIENT
Start: 2019-05-15 | End: 2019-05-15 | Stop reason: SDUPTHER

## 2019-05-15 RX ORDER — FOLIC ACID 1 MG/1
1 TABLET ORAL DAILY
Refills: 0 | COMMUNITY
Start: 2019-02-15

## 2019-05-15 RX ORDER — BUPIVACAINE HYDROCHLORIDE 2.5 MG/ML
INJECTION, SOLUTION EPIDURAL; INFILTRATION; INTRACAUDAL PRN
Status: DISCONTINUED | OUTPATIENT
Start: 2019-05-15 | End: 2019-05-15 | Stop reason: ALTCHOICE

## 2019-05-15 RX ORDER — SODIUM CHLORIDE 0.9 % (FLUSH) 0.9 %
10 SYRINGE (ML) INJECTION EVERY 12 HOURS SCHEDULED
Status: DISCONTINUED | OUTPATIENT
Start: 2019-05-15 | End: 2019-05-16 | Stop reason: HOSPADM

## 2019-05-15 RX ORDER — KETOROLAC TROMETHAMINE 30 MG/ML
INJECTION, SOLUTION INTRAMUSCULAR; INTRAVENOUS PRN
Status: DISCONTINUED | OUTPATIENT
Start: 2019-05-15 | End: 2019-05-15 | Stop reason: SDUPTHER

## 2019-05-15 RX ORDER — LANOLIN ALCOHOL/MO/W.PET/CERES
100 CREAM (GRAM) TOPICAL DAILY
Refills: 0 | COMMUNITY
Start: 2019-02-15

## 2019-05-15 RX ORDER — PROPOFOL 10 MG/ML
INJECTION, EMULSION INTRAVENOUS CONTINUOUS PRN
Status: DISCONTINUED | OUTPATIENT
Start: 2019-05-15 | End: 2019-05-15 | Stop reason: SDUPTHER

## 2019-05-15 RX ORDER — THIAMINE MONONITRATE (VIT B1) 100 MG
100 TABLET ORAL DAILY
Status: DISCONTINUED | OUTPATIENT
Start: 2019-05-15 | End: 2019-05-16 | Stop reason: HOSPADM

## 2019-05-15 RX ORDER — CHLORDIAZEPOXIDE HYDROCHLORIDE 25 MG/1
25 CAPSULE, GELATIN COATED ORAL 4 TIMES DAILY PRN
Status: DISCONTINUED | OUTPATIENT
Start: 2019-05-15 | End: 2019-05-16 | Stop reason: HOSPADM

## 2019-05-15 RX ORDER — HYDROCODONE BITARTRATE AND ACETAMINOPHEN 5; 325 MG/1; MG/1
2 TABLET ORAL EVERY 4 HOURS PRN
Status: DISCONTINUED | OUTPATIENT
Start: 2019-05-15 | End: 2019-05-16 | Stop reason: HOSPADM

## 2019-05-15 RX ORDER — MIDAZOLAM HYDROCHLORIDE 1 MG/ML
INJECTION INTRAMUSCULAR; INTRAVENOUS PRN
Status: DISCONTINUED | OUTPATIENT
Start: 2019-05-15 | End: 2019-05-15 | Stop reason: SDUPTHER

## 2019-05-15 RX ORDER — FUROSEMIDE 20 MG/1
20 TABLET ORAL 2 TIMES DAILY
Status: DISCONTINUED | OUTPATIENT
Start: 2019-05-15 | End: 2019-05-16 | Stop reason: HOSPADM

## 2019-05-15 RX ORDER — DILTIAZEM HYDROCHLORIDE 180 MG/1
180 CAPSULE, COATED, EXTENDED RELEASE ORAL 2 TIMES DAILY
Status: DISCONTINUED | OUTPATIENT
Start: 2019-05-15 | End: 2019-05-16 | Stop reason: HOSPADM

## 2019-05-15 RX ORDER — TIZANIDINE 4 MG/1
4 TABLET ORAL 3 TIMES DAILY PRN
Status: DISCONTINUED | OUTPATIENT
Start: 2019-05-15 | End: 2019-05-16 | Stop reason: HOSPADM

## 2019-05-15 RX ORDER — HEPARIN SODIUM 1000 [USP'U]/ML
INJECTION, SOLUTION INTRAVENOUS; SUBCUTANEOUS PRN
Status: DISCONTINUED | OUTPATIENT
Start: 2019-05-15 | End: 2019-05-15 | Stop reason: ALTCHOICE

## 2019-05-15 RX ORDER — CHLORDIAZEPOXIDE HYDROCHLORIDE 25 MG/1
25 CAPSULE, GELATIN COATED ORAL
Refills: 0 | COMMUNITY
Start: 2019-02-15

## 2019-05-15 RX ADMIN — PANTOPRAZOLE SODIUM 40 MG: 40 TABLET, DELAYED RELEASE ORAL at 20:31

## 2019-05-15 RX ADMIN — HYDROCODONE BITARTRATE AND ACETAMINOPHEN 2 TABLET: 5; 325 TABLET ORAL at 19:40

## 2019-05-15 RX ADMIN — DEXTROSE MONOHYDRATE 3 G: 50 INJECTION, SOLUTION INTRAVENOUS at 15:19

## 2019-05-15 RX ADMIN — Medication 10 ML: at 15:19

## 2019-05-15 RX ADMIN — CEFAZOLIN 3 G: 330 INJECTION, POWDER, FOR SOLUTION INTRAMUSCULAR; INTRAVENOUS at 07:40

## 2019-05-15 RX ADMIN — KETOROLAC TROMETHAMINE 30 MG: 30 INJECTION, SOLUTION INTRAMUSCULAR at 08:46

## 2019-05-15 RX ADMIN — HYDROCODONE BITARTRATE AND ACETAMINOPHEN 2 TABLET: 5; 325 TABLET ORAL at 23:47

## 2019-05-15 RX ADMIN — Medication 10 ML: at 16:06

## 2019-05-15 RX ADMIN — Medication 400 MG: at 10:59

## 2019-05-15 RX ADMIN — DILTIAZEM HYDROCHLORIDE 180 MG: 180 CAPSULE, COATED, EXTENDED RELEASE ORAL at 20:31

## 2019-05-15 RX ADMIN — DEXAMETHASONE SODIUM PHOSPHATE 10 MG: 10 INJECTION INTRAMUSCULAR; INTRAVENOUS at 07:53

## 2019-05-15 RX ADMIN — THIAMINE HCL TAB 100 MG 100 MG: 100 TAB at 16:11

## 2019-05-15 RX ADMIN — HYDROCODONE BITARTRATE AND ACETAMINOPHEN 2 TABLET: 5; 325 TABLET ORAL at 10:07

## 2019-05-15 RX ADMIN — MIDAZOLAM HYDROCHLORIDE 2 MG: 2 INJECTION, SOLUTION INTRAMUSCULAR; INTRAVENOUS at 07:38

## 2019-05-15 RX ADMIN — METFORMIN HYDROCHLORIDE 500 MG: 500 TABLET ORAL at 16:11

## 2019-05-15 RX ADMIN — SERTRALINE HYDROCHLORIDE 50 MG: 50 TABLET ORAL at 20:31

## 2019-05-15 RX ADMIN — Medication 10 ML: at 10:59

## 2019-05-15 RX ADMIN — LIDOCAINE HYDROCHLORIDE 3 ML: 10 INJECTION, SOLUTION EPIDURAL; INFILTRATION; INTRACAUDAL; PERINEURAL at 07:49

## 2019-05-15 RX ADMIN — FUROSEMIDE 20 MG: 20 TABLET ORAL at 16:11

## 2019-05-15 RX ADMIN — FENTANYL CITRATE 50 MCG: 50 INJECTION INTRAMUSCULAR; INTRAVENOUS at 07:47

## 2019-05-15 RX ADMIN — SODIUM CHLORIDE: 9 INJECTION, SOLUTION INTRAVENOUS at 06:47

## 2019-05-15 RX ADMIN — PROPOFOL 75 MCG/KG/MIN: 10 INJECTION INTRAVENOUS at 07:48

## 2019-05-15 RX ADMIN — Medication 10 ML: at 20:31

## 2019-05-15 RX ADMIN — ONDANSETRON 4 MG: 2 INJECTION, SOLUTION INTRAMUSCULAR; INTRAVENOUS at 07:56

## 2019-05-15 RX ADMIN — HYDROCODONE BITARTRATE AND ACETAMINOPHEN 2 TABLET: 5; 325 TABLET ORAL at 14:51

## 2019-05-15 RX ADMIN — DEXTROSE MONOHYDRATE 3 G: 50 INJECTION, SOLUTION INTRAVENOUS at 23:47

## 2019-05-15 ASSESSMENT — PAIN DESCRIPTION - LOCATION
LOCATION: CHEST

## 2019-05-15 ASSESSMENT — PAIN SCALES - GENERAL
PAINLEVEL_OUTOF10: 7
PAINLEVEL_OUTOF10: 8
PAINLEVEL_OUTOF10: 5
PAINLEVEL_OUTOF10: 8
PAINLEVEL_OUTOF10: 7
PAINLEVEL_OUTOF10: 7
PAINLEVEL_OUTOF10: 0
PAINLEVEL_OUTOF10: 0
PAINLEVEL_OUTOF10: 7
PAINLEVEL_OUTOF10: 5
PAINLEVEL_OUTOF10: 8

## 2019-05-15 ASSESSMENT — PAIN DESCRIPTION - PAIN TYPE
TYPE: SURGICAL PAIN

## 2019-05-15 ASSESSMENT — PAIN DESCRIPTION - ORIENTATION
ORIENTATION: LEFT

## 2019-05-15 NOTE — LETTER
Mrs. Lebron Paez was with  for surgery o 5/15/19 and again on 5/16/19 at [facility].    Thank you, MIRTA Jennings RN  5/16/19

## 2019-05-15 NOTE — ANESTHESIA PRE PROCEDURE
Department of Anesthesiology  Preprocedure Note       Name:  Shaggy Cardoso   Age:  66 y.o.  :  1955                                          MRN:  [id number]         Date:  5/15/2019      Surgeon: Christie Magdaleno):  Mao Laurent MD    Procedure: PACEMAKER INSERTION PERMANENT (N/A )    Medications prior to admission:   Prior to Admission medications    Medication Sig Start Date End Date Taking? Authorizing Provider   metFORMIN (GLUCOPHAGE) 500 MG tablet Take 500 mg by mouth daily   Yes Historical Provider, MD   pantoprazole (PROTONIX) 40 MG tablet Take 40 mg by mouth daily   Yes Historical Provider, MD   meclizine (ANTIVERT) 25 MG tablet Take 25 mg by mouth 3 times daily as needed   Yes Historical Provider, MD   sertraline (ZOLOFT) 50 MG tablet Take 50 mg by mouth daily   Yes Historical Provider, MD   magnesium oxide (MAG-OX) 400 MG tablet Take 1 tablet by mouth daily 19  Yes Mao Laurent MD   isosorbide mononitrate (IMDUR) 30 MG extended release tablet Take 0.5 tablets by mouth daily 18  Yes Mao Laurent MD   diltiazem (CARDIZEM CD) 180 MG extended release capsule Take 1 capsule by mouth 2 times daily 3/21/18  Yes Mao Laurent MD   ferrous sulfate 325 (65 Fe) MG EC tablet Take 325 mg by mouth 2 times daily   Yes Historical Provider, MD   furosemide (LASIX) 20 MG tablet Take 20 mg by mouth 2 times daily    Yes Historical Provider, MD   allopurinol (ZYLOPRIM) 100 MG tablet Take 100 mg by mouth daily    Yes Historical Provider, MD   indomethacin (INDOCIN) 50 MG capsule Take 1 capsule by mouth 3 times daily (with meals). Patient taking differently: Take 50 mg by mouth 3 times daily as needed  14  Yes Kojo Milton MD   folic acid (FOLVITE) 1 MG tablet 1 mg daily 2/15/19   Historical Provider, MD   chlordiazePOXIDE (LIBRIUM) 25 MG capsule Take 25 mg by mouth.  2/15/19   Historical Provider, MD   thiamine 100 MG tablet 100 mg daily 2/15/19   Historical Provider, MD   tiZANidine (Lyndy Anna) 4 MG tablet 4 mg as needed 2/23/19   Historical Provider, MD   rivaroxaban (XARELTO) 20 MG TABS tablet Take 1 tablet by mouth daily 1/29/18   Mar Wood MD       Current medications:    Current Facility-Administered Medications   Medication Dose Route Frequency Provider Last Rate Last Dose    0.9 % sodium chloride infusion   Intravenous Continuous Mar Wood MD        ceFAZolin (ANCEF) 3 g in dextrose 5 % 100 mL IVPB  3 g Intravenous On Call to [address] MD Maggy           Allergies: Allergies   Allergen Reactions    Metoprolol Shortness Of Breath    Dye [Gadolinium Derivatives]      Had seizure following injection of dye into spine during surgery. Has had heart cath since with no reaction. Problem List:    Patient Active Problem List   Diagnosis Code    Atrial fibrillation (HCC) I48.91    Osteoarthritis M19.90    Type II or unspecified type diabetes mellitus without mention of complication, not stated as uncontrolled E11.9    Diabetes mellitus ([address] E11.9    Hypertension I10    Pseudoaneurysm ([address] I72.9       Past Medical History:        Diagnosis Date    Anemia     Atrial fibrillation ([address] 9/17/12    Diabetes mellitus ([address]     Gout     Hiatal hernia     Hypertension     Osteoarthritis     PUD (peptic ulcer disease)     Type II or unspecified type diabetes mellitus without mention of complication, not stated as uncontrolled        Past Surgical History:        Procedure Laterality Date   [facility]    Cardiac catheterization on October 10, 2012 that showed mild plaque disease with EF of 50% to 55%    CARDIAC CATHETERIZATION Left 06/21/2017    Dr. Guillermo Peña @ [facility]--30% to 0% disease in the proximal & mid-left anterior descending. 30% disease in the right coronary. Unremarkable circumflex. Ejection fraction of 40%.    [place] [id number]    [facility]    HERNIA REPAIR      [place]    SP[place] SURGERY  1993    [place]    TONSILLECTOMY      [place]       Social History:    Social History     Tobacco Use    Smoking status: Former Smoker     Packs/day: 1.00     Years: 10.00     Pack years: 10.00     Last attempt to quit: 2011     Years since quittin.3    Smokeless tobacco: Never Used   Substance Use Topics    Alcohol use: Yes     Alcohol/week: 3.0 oz     Types: 5 Cans of beer per week     Comment: occasional                                Counseling given: Not Answered      Vital Signs (Current):   Vitals:    05/15/19 0606   Weight: 269 lb (122 kg)   Height: 5' 11\" (1.803 m)                                              BP Readings from Last 3 Encounters:   05/10/19 (!) 140/80   18 (!) 140/80   17 130/70       NPO Status:  2230                                                                               BMI:   Wt Readings from Last 3 Encounters:   05/15/19 269 lb (122 kg)   05/10/19 274 lb (124.3 kg)   18 278 lb (126.1 kg)     Body mass index is 37.52 kg/m². CBC:   Lab Results   Component Value Date    WBC 2.9 2019    RBC 3.47 2019    RBC 4.05 2011    HGB 10.2 2019    HCT 30.5 2019    MCV 87.9 2019    RDW 18.0 2019     2019     2011       CMP:   Lab Results   Component Value Date     2019    K 5.0 2019    CL 98 2019    CO2 23 2019    BUN 17 2019    CREATININE 1.29 2019    GFRAA >60 2019    LABGLOM 57 2019    GLUCOSE 120 2019    GLUCOSE 133 2011    PROT 6.7 2019    CALCIUM 8.3 2019    BILITOT 0.79 2019    ALKPHOS 86 2019    AST 16 2019    ALT 7 2019       POC Tests:   Recent Labs     05/15/19  0613   POCGLU 119*       Coags:   Lab Results   Component Value Date    PROTIME 12.7 2016    INR 1.2 2016    APTT 31.6 2016       HCG (If Applicable):  No results found for: PREGTESTUR, PREGSERUM, HCG, HCGQUANT     ABGs: No results found for: PHART, PO2ART, LED6QIO, OPD2CET, BEART, R2VYTVWY     Type & Screen (If Applicable):  No results found for: [facility]    Anesthesia Evaluation  Patient summary reviewed and Nursing notes reviewed no history of anesthetic complications:   Airway: Mallampati: II  TM distance: >3 FB   Neck ROM: full  Mouth opening: > = 3 FB Dental: normal exam         Pulmonary:Negative Pulmonary ROS and normal exam                               Cardiovascular:    (+) hypertension: no interval change, dysrhythmias: atrial fibrillation,       ECG reviewed      Echocardiogram reviewed         Beta Blocker:  Not on Beta Blocker         Neuro/Psych:   Negative Neuro/Psych ROS              GI/Hepatic/Renal:   (+) hiatal hernia, PUD,           Endo/Other:    (+) DiabetesType II DM, no interval change, , .          Pt had PAT visit. Abdominal:           Vascular:                                      Anesthesia Plan      MAC     ASA 3             Anesthetic plan and risks discussed with patient. Plan discussed with attending.                   Shi Pagan, APRN - CRNA   5/15/2019

## 2019-05-15 NOTE — MISCELLANEOUS
[place] [address]      Vivi Anguiano M.D. [address] [place], [place] [address]   [phone]          May 15, 2019          Sarah Garcia MD  [address] [place], [place], [address]    RE:   Verena   :  1955    Dear Dr. Elif Lazar:    I did place a VVI permanent pacemaker in Mr. Edwina Caicedo for his  tachybrady syndrome. He is in chronic atrial fibrillation as you  know. We placed a Medtronic Rincon MRI-compatible pacemaker without  difficulty with good thresholds. There were no complications. Hopefully, he will do well long-term. Thank you very much.     Sincerely,      Gorge Denise    D: 05/15/2019 9:09:08       T: 05/15/2019 9:57:58     [id number]  Job#: [id number]     Doc#: [id number]

## 2019-05-15 NOTE — PROCEDURES
[place] [address]                            CARDIAC CATHETERIZATION    PATIENT NAME: Garrett Godfrey                   :        1955  MED REC NO:   [id number]                              ROOM:       4066  ACCOUNT NO:   [de-identified]                           ADMIT DATE: 05/15/2019  PROVIDER:     Crystal Altamirano    DATE OF PROCEDURE:  05/15/2019    NAME OF PROCEDURE:  Implantation of VVI permanent pacemaker, which is a  Medtronic [place] XT SR MRI-compatible VVI pacemaker. INDICATION:  1. Sick sinus syndrome associated with syncope. 2.  Chronic atrial fibrillation. PROCEDURE:  We prepped and draped in the usual manner for a pacemaker  through the left subclavian region. Using deep sedation and local  analgesia, I made an incision and a pocket in the subclavian region. Making a pocket, we irrigated it with vancomycin solution. He had  oozing and we spent a great amount of time with using a Bovie to stop  all oozing. We then placed a vancomycin-infused gauze into the pocket. I then cannulated the left subclavian vein and placed a guidewire within  the left subclavian vein. I used a sheet and through this sheet, I  placed a ventricular lead. This went easily into his right ventricle  and we found a good placement and it was screwed in place. We checked  multiple times with good threshold and sensing. We sutured in place  with 3-0 silk sutures. I then irrigated the pocket again and I spent an inordinate amount of  time checking for oozing, which was dealt with. We then attached the generator to the wire and placed a generator within  the pocket. We sutured the generator to the pocket with 0 silk. We  then closed with a 2-0 Vicryl and subcutaneous fascia with a 4-0 Vicryl. Steri-Strips were applied. There were no complications.     Pacemaker generator is a Medtronic [id number] Rosa Isela XT SR MRI-compatible  generator, model number [id number], serial number [id number]. Implant date,  05/15/2019. Ventricle lead is a model number [id number], serial number  [id number]. Lead thresholds 0.5 milliseconds, 0.7 volts, 0.8 milliamps,  904 ohms with a P wave of 10.8. Final settings, lower rate 60, upper  rate 130, amplitude 3.5 volts, 0.4 milliseconds, 0.9 millivolts. IMPRESSION:  Successful implantation of VVI permanent pacemaker for sick  sinus syndrome.           Tomy Almanzar    D: 05/15/2019 9:09:08       T: 05/15/2019 9:52:48     [id number]  Job#: [id number]     Doc#: [id number]    CC:  Farhan Santos

## 2019-05-15 NOTE — PROGRESS NOTES
Amish Chapman M.D. [address] [place] [address] [facility]  [phone]          May 10, 2019          Cynthia Rich MD   [address], [address] [place] [address]      RE:   Ofelia Barnes  :  1955      Dear Dr. Ubaldo Bui:    Karina Clines:  1. Symptomatic bradycardia. 2.  Chronic atrial fibrillation. HISTORY OF PRESENT ILLNESS:  I had the pleasure of seeing Mr. Eden Gold in the office on 05/10/2019. He is a pleasant 22-year-old  gentleman, who has chronic atrial fibrillation, he had a catheterization in [address] [place] [address] in [place], which is unremarkable and a catheterization by myself on 10/10/2012 that showed 30 to 40% LAD with mild disease in the right coronary artery and circumflex, EF of 50 to 55%. On 2017, he had tachycardia. A stress test was abnormal and his EF is 43%. We did a catheterization on 2017, which was unchanged, where he had 30 to 40% disease in the LAD and circumflex with 30% RCA disease, EF of 40%, with medical therapy recommended. He has a long history of tachycardia as a ventricular response. He has had difficulty with beta blockers historically and therefore, his rate have been controlled with Cardizem and digoxin. On 2019, he was mowing the lawn when he became very lightheaded and began staggering. This was witnessed by his wife, who came out to stop him and then Mr. Eden Gold had a syncopal episode. He was taken to [facility] where he was found to have a heart rate of 30. He was also in renal failure and hyperkalemic with a potassium of 6.8. He was placed on dopamine and digoxin was stopped and he gradually recovered after fluids for his renal insufficiency. He was discharged and I am seeing him in followup. He was discharged without his digoxin. Since discharge, he has continued to have episodes of lightheadedness.   He takes his blood pressure and heart rate at home.  Occasionally, his heart rate is in the high 30s and low 40s and occasionally, it is in the 120 to 130 range. His blood pressure is generally in the 110 to 130 mmHg range. He has had no syncope but does have lightheadedness, especially when his heart rate is low. He did have an MRI, which was unremarkable while he was in [facility]. He does drink alcohol on a daily basis. He was discharged on 04/12. He does not drive because of his syncopal episode that brought him to the hospital.    He denies any exertional chest pain or chest discomfort. He has had no PND or orthopnea. He does have mild edema, which is chronic. He was placed on a diuretic. He is pending having back surgery by Dr. Brionna Loo at the [facility] on 06/19/2019. He did have a colonoscopy by Dr. Worthy Pallas approximately 2 weeks ago, which is unremarkable. CARDIAC RISK FACTORS:  Known CAD:  Mildly positive. Hyperlipidemia:  Positive. Other Family Members:  Positive. Peripheral Vascular Disease:  Negative. Diabetes:  Positive. Smoking:  Negative. MEDICATIONS:  At home, he is currently on Zyloprim 400 mg daily, Cardizem  mg b.i.d., iron 5 grains b.i.d., Lasix 20 mg b.i.d., indomethacin 50 mg t.i.d. p.r.n., Imdur 30 mg half a tablet daily, magnesium 400 mg daily, Antivert 25 mg t.i.d. p.r.n., metformin 850 mg daily, Xarelto 20 mg daily, Protonix 40 mg daily, Zoloft 50 mg daily. PAST MEDICAL HISTORY:  1. He had appendectomy and tonsillectomy as a child. 2.  Hernia repair in 2011.  3.  Back surgery in 1993.  4.  Hypertension for 12 years. 5.  Diabetes for 13 years. 6.  Last catheterization was on 06/21/2017 that showed 30 to 40% LAD and right coronary artery disease with unremarkable circumflex with EF of 40%. 7.  He does have a history of cirrhosis and history of alcohol abuse. 8.  He has had left eye surgery, he cannot close his left eye.  9.  He has chronic mild renal insufficiency.   10.  He has a history of pedal pulses. NEUROLOGIC EXAM:  Within normal limits. PSYCHIATRIC EXAM:  Within normal limits. LABORATORY DATA:  Sodium 133, potassium 5.0, BUN of 17, creatinine 1.29, GFR is 57, magnesium 1.1, glucose 120, calcium is 8.3. Cholesterol 129 with a HDL of 46, LDL of 65, triglycerides 88, ALT was 7, AST was 16. TSH 3.16. Vitamin D was 29.2. White count 2.9, hemoglobin 10.2 with a platelet count of 487,121. EKG on 05/06 showed atrial fibrillation with possible old anteroseptal myocardial infarction, which was unchanged from previous EKGs. Chest x-ray on 05/06/2019, was unremarkable. He brought in a list of blood pressures and heart rates. His blood pressure ranges from 78/44 to 132/91. His heart rate was as low as 40 and as high as 139. IMPRESSION:  1. Sick sinus syndrome with tachy-bradycardia with a syncopal episode on 04/09/2019 with a hospitalization until 04/13 at [facility], complicated by renal failure, hypokalemia and digoxin toxicity. 2.  Continued symptomatic tachy-bradycardia with heart rates up to 139 and as low as 40 with hypotension when his heart rates in the 40s associated with lightheaded and dizziness. 3.  Chronic atrial fibrillation, on Xarelto. First discovered in atrial fibrillation in 2009.  4.  Mild coronary artery disease with a catheterization in [address] [place] [address] in [place] as well as 10/10/2012 and 06/21/2017, all showing 30 to 40% disease in all three major vessels with an EF of 40%. 5.  History of elevated liver function tests, possibly secondary to alcohol. 6.  History of alcohol abuse. 7.  History of anemia, which is chronic. 8.  Mild LV dysfunction, EF in the 40 to 45% range, possibly secondary to alcohol. PLAN:  1. VVI permanent pacemaker. 2.  Adjustments of his medications post-pacemaker to control his tachycardia knowing his bradycardia is controlled. 3.  Cleared for back surgery on 06/19/2019, at [facility] by Dr. Meg Owen.     DISCUSSION:  Mr. Israel Castillo is fairly complex. He has had chronic atrial fibrillation and has always had difficulty with rapid ventricular response. However, it had been well controlled with Cardizem and digoxin. Of note, he did not seem to tolerate beta blocker such as metoprolol, which had been tried on several occasions. However, he has developed tachy-juan syndrome. He was hospitalized on 04/09 after a syncopal episode. He was digoxin toxicity and did have hyperkalemia, and was in acute renal failure which all resolved. His digoxin was stopped and he has been maintained on Cardizem. However, with Cardizem alone he is having still heart rates at 40 and also having tachycardia with heart rates in the 139 to 140 range. He is very symptomatic when his heart rate is in the 40s and is also hypotensive. Therefore, I will need to place a VVI permanent pacemaker to control his bradycardia as we control his tachycardia with medications. He is pending having back surgery by Dr. Naren Flynn on 06/19. Therefore, we will be expedient in doing the pacemaker as soon as possible, so that he can recover and have his back surgery done. I have made no change in his medications. I will do an echocardiogram as it was not done at [facility] to document his LV function. Thank you very much for allowing me the privilege of seeing Mr. Edwina Caicedo. If you have any questions on my thoughts, please do not hesitate to contact me.      Sincerely,        Gorge Denise    D: 05/10/2019 12:37:44     T: 05/10/2019 14:34:48     [id number]  Job#: [id number]   Doc#: [id number]

## 2019-05-15 NOTE — PROGRESS NOTES
States that pain is better after oral pain medication. Declines to get out of bed for supper. States that the will get up this evening. Denies further needs. Multiple family at bedside. Call light in reach.

## 2019-05-15 NOTE — PLAN OF CARE
Problem: Pain:  Goal: Control of acute pain  Description  Control of acute pain  Outcome: Ongoing- Pain controlled with oral pain medication.

## 2019-05-15 NOTE — ANESTHESIA POSTPROCEDURE EVALUATION
Department of Anesthesiology  Postprocedure Note    Patient: Angela Tay  MRN: [id number]  YOB: 1955  Date of evaluation: 5/15/2019  Time:  9:18 AM     Procedure Summary     Date:  05/15/19 Room / Location:  [address] [place] [address] 02 / [facility] Lasso OR    Anesthesia Start:  [address] Anesthesia Stop:  0873    Procedure:  PACEMAKER INSERTION PERMANENT (Left Chest) Diagnosis:  (TACHY/ SAUL)    Surgeon:  Wilfred Ambriz MD Responsible Provider:  DWAYNE Perez CRNA    Anesthesia Type:  MAC ASA Status:  3          Anesthesia Type: MAC    Adalberto Phase I: Adalberto Score: 10    Adalberto Phase II:      Last vitals: Reviewed and per EMR flowsheets.        Anesthesia Post Evaluation    Patient location during evaluation: PACU  Patient participation: complete - patient participated  Level of consciousness: awake and alert  Pain score: 0  Airway patency: patent  Nausea & Vomiting: no nausea and no vomiting  Complications: no  Cardiovascular status: hemodynamically stable  Respiratory status: acceptable and spontaneous ventilation  Hydration status: euvolemic

## 2019-05-15 NOTE — H&P
Nia Campos M.D. [address] [place] [address] [facility]  [phone]          May 10, 2019          Ciara Denton MD   [address], [address] [place] [address]      RE:   Marco Orellana  :  1955      Dear Dr. Jessica Moyer:    Cordell Even:  1. Symptomatic bradycardia. 2.  Chronic atrial fibrillation. HISTORY OF PRESENT ILLNESS:  I had the pleasure of seeing Mr. Whit Juarez in the office on 05/10/2019. He is a pleasant 57-year-old  gentleman, who has chronic atrial fibrillation, he had a catheterization in [address] [place] [address] in [facility], which is unremarkable and a catheterization by myself on 10/10/2012 that showed 30 to 40% LAD with mild disease in the right coronary artery and circumflex, EF of 50 to 55%. On 2017, he had tachycardia. A stress test was abnormal and his EF is 43%. We did a catheterization on 2017, which was unchanged, where he had 30 to 40% disease in the LAD and circumflex with 30% RCA disease, EF of 40%, with medical therapy recommended. He has a long history of tachycardia as a ventricular response. He has had difficulty with beta blockers historically and therefore, his rate have been controlled with Cardizem and digoxin. On 2019, he was mowing the lawn when he became very lightheaded and began staggering. This was witnessed by his wife, who came out to stop him and then Mr. Whit Juarez had a syncopal episode. He was taken to [facility] where he was found to have a heart rate of 30. He was also in renal failure and hyperkalemic with a potassium of 6.8. He was placed on dopamine and digoxin was stopped and he gradually recovered after fluids for his renal insufficiency. He was discharged and I am seeing him in followup. He was discharged without his digoxin. Since discharge, he has continued to have episodes of lightheadedness.   He takes his blood pressure and heart rate at pedal pulses. NEUROLOGIC EXAM:  Within normal limits. PSYCHIATRIC EXAM:  Within normal limits. LABORATORY DATA:  Sodium 133, potassium 5.0, BUN of 17, creatinine 1.29, GFR is 57, magnesium 1.1, glucose 120, calcium is 8.3. Cholesterol 129 with a HDL of 46, LDL of 65, triglycerides 88, ALT was 7, AST was 16. TSH 3.16. Vitamin D was 29.2. White count 2.9, hemoglobin 10.2 with a platelet count of 144,813. EKG on 05/06 showed atrial fibrillation with possible old anteroseptal myocardial infarction, which was unchanged from previous EKGs. Chest x-ray on 05/06/2019, was unremarkable. He brought in a list of blood pressures and heart rates. His blood pressure ranges from 78/44 to 132/91. His heart rate was as low as 40 and as high as 139. IMPRESSION:  1. Sick sinus syndrome with tachy-bradycardia with a syncopal episode on 04/09/2019 with a hospitalization until 04/13 at [facility], complicated by renal failure, hypokalemia and digoxin toxicity. 2.  Continued symptomatic tachy-bradycardia with heart rates up to 139 and as low as 40 with hypotension when his heart rates in the 40s associated with lightheaded and dizziness. 3.  Chronic atrial fibrillation, on Xarelto. First discovered in atrial fibrillation in 2009.  4.  Mild coronary artery disease with a catheterization in  in [place] as well as 10/10/2012 and 06/21/2017, all showing 30 to 40% disease in all three major vessels with an EF of 40%. 5.  History of elevated liver function tests, possibly secondary to alcohol. 6.  History of alcohol abuse. 7.  History of anemia, which is chronic. 8.  Mild LV dysfunction, EF in the 40 to 45% range, possibly secondary to alcohol. PLAN:  1. VVI permanent pacemaker. 2.  Adjustments of his medications post-pacemaker to control his tachycardia knowing his bradycardia is controlled. 3.  Cleared for back surgery on 06/19/2019, at [facility] by Dr. Jose Gonzalez.     DISCUSSION:  Mr. Christie Christine is fairly complex. He has had chronic atrial fibrillation and has always had difficulty with rapid ventricular response. However, it had been well controlled with Cardizem and digoxin. Of note, he did not seem to tolerate beta blocker such as metoprolol, which had been tried on several occasions. However, he has developed tachy-juna syndrome. He was hospitalized on 04/09 after a syncopal episode. He was digoxin toxicity and did have hyperkalemia, and was in acute renal failure which all resolved. His digoxin was stopped and he has been maintained on Cardizem. However, with Cardizem alone he is having still heart rates at 40 and also having tachycardia with heart rates in the 139 to 140 range. He is very symptomatic when his heart rate is in the 40s and is also hypotensive. Therefore, I will need to place a VVI permanent pacemaker to control his bradycardia as we control his tachycardia with medications. He is pending having back surgery by Dr. Savana Rajput on 06/19. Therefore, we will be expedient in doing the pacemaker as soon as possible, so that he can recover and have his back surgery done. I have made no change in his medications. I will do an echocardiogram as it was not done at [facility] to document his LV function. Thank you very much for allowing me the privilege of seeing Mr. Hazel De Santiago. If you have any questions on my thoughts, please do not hesitate to contact me.      Sincerely,        Sherman Rodriguez    D: 05/10/2019 12:37:44     T: 05/10/2019 14:34:48     [id number]  Job#: [id number]   Doc#: [id number]

## 2019-05-16 ENCOUNTER — APPOINTMENT (OUTPATIENT)
Dept: GENERAL RADIOLOGY | Age: 64
End: 2019-05-16
Attending: INTERNAL MEDICINE
Payer: COMMERCIAL

## 2019-05-16 VITALS
BODY MASS INDEX: 37.66 KG/M2 | TEMPERATURE: 98.1 F | WEIGHT: 269 LBS | HEART RATE: 105 BPM | OXYGEN SATURATION: 96 % | HEIGHT: 71 IN | DIASTOLIC BLOOD PRESSURE: 85 MMHG | RESPIRATION RATE: 18 BRPM | SYSTOLIC BLOOD PRESSURE: 136 MMHG

## 2019-05-16 LAB
HCT VFR BLD CALC: 34 % (ref 41–53)
HEMOGLOBIN: 11.2 G/DL (ref 13.5–17.5)
MCH RBC QN AUTO: 29.6 PG (ref 26–34)
MCHC RBC AUTO-ENTMCNC: 33.1 G/DL (ref 31–37)
MCV RBC AUTO: 89.6 FL (ref 80–100)
NRBC AUTOMATED: ABNORMAL PER 100 WBC
PDW BLD-RTO: 16.9 % (ref 12.1–15.2)
PLATELET # BLD: 248 K/UL (ref 140–450)
PMV BLD AUTO: ABNORMAL FL (ref 6–12)
RBC # BLD: 3.79 M/UL (ref 4.5–5.9)
WBC # BLD: 9 K/UL (ref 3.5–11)

## 2019-05-16 PROCEDURE — 99024 POSTOP FOLLOW-UP VISIT: CPT | Performed by: INTERNAL MEDICINE

## 2019-05-16 PROCEDURE — 94761 N-INVAS EAR/PLS OXIMETRY MLT: CPT

## 2019-05-16 PROCEDURE — 6370000000 HC RX 637 (ALT 250 FOR IP): Performed by: INTERNAL MEDICINE

## 2019-05-16 PROCEDURE — 71046 X-RAY EXAM CHEST 2 VIEWS: CPT

## 2019-05-16 PROCEDURE — 36415 COLL VENOUS BLD VENIPUNCTURE: CPT

## 2019-05-16 PROCEDURE — 2580000003 HC RX 258: Performed by: INTERNAL MEDICINE

## 2019-05-16 PROCEDURE — 85027 COMPLETE CBC AUTOMATED: CPT

## 2019-05-16 RX ORDER — HYDROCODONE BITARTRATE AND ACETAMINOPHEN 5; 325 MG/1; MG/1
1 TABLET ORAL EVERY 4 HOURS PRN
Qty: 20 TABLET | Refills: 0 | Status: SHIPPED | OUTPATIENT
Start: 2019-05-16 | End: 2019-05-21

## 2019-05-16 RX ORDER — DIGOXIN 125 MCG
125 TABLET ORAL DAILY
Status: DISCONTINUED | OUTPATIENT
Start: 2019-05-16 | End: 2019-05-16 | Stop reason: HOSPADM

## 2019-05-16 RX ORDER — DIGOXIN 125 MCG
125 TABLET ORAL DAILY
Qty: 30 TABLET | Refills: 3 | Status: SHIPPED | OUTPATIENT
Start: 2019-05-16

## 2019-05-16 RX ADMIN — ISOSORBIDE MONONITRATE 15 MG: 30 TABLET, EXTENDED RELEASE ORAL at 08:46

## 2019-05-16 RX ADMIN — Medication 10 ML: at 08:49

## 2019-05-16 RX ADMIN — HYDROCODONE BITARTRATE AND ACETAMINOPHEN 2 TABLET: 5; 325 TABLET ORAL at 04:39

## 2019-05-16 RX ADMIN — DILTIAZEM HYDROCHLORIDE 180 MG: 180 CAPSULE, COATED, EXTENDED RELEASE ORAL at 08:45

## 2019-05-16 RX ADMIN — METOPROLOL TARTRATE 25 MG: 25 TABLET ORAL at 06:22

## 2019-05-16 RX ADMIN — PANTOPRAZOLE SODIUM 40 MG: 40 TABLET, DELAYED RELEASE ORAL at 08:45

## 2019-05-16 RX ADMIN — Medication 400 MG: at 08:45

## 2019-05-16 RX ADMIN — METFORMIN HYDROCHLORIDE 500 MG: 500 TABLET ORAL at 08:45

## 2019-05-16 RX ADMIN — HYDROCODONE BITARTRATE AND ACETAMINOPHEN 2 TABLET: 5; 325 TABLET ORAL at 08:46

## 2019-05-16 RX ADMIN — FUROSEMIDE 20 MG: 20 TABLET ORAL at 08:45

## 2019-05-16 RX ADMIN — HYDROCODONE BITARTRATE AND ACETAMINOPHEN 2 TABLET: 5; 325 TABLET ORAL at 13:06

## 2019-05-16 RX ADMIN — DIGOXIN 125 MCG: 125 TABLET ORAL at 08:45

## 2019-05-16 RX ADMIN — ALLOPURINOL 100 MG: 100 TABLET ORAL at 08:45

## 2019-05-16 RX ADMIN — THIAMINE HCL TAB 100 MG 100 MG: 100 TAB at 08:45

## 2019-05-16 ASSESSMENT — PAIN SCALES - GENERAL
PAINLEVEL_OUTOF10: 7
PAINLEVEL_OUTOF10: 7
PAINLEVEL_OUTOF10: 8
PAINLEVEL_OUTOF10: 7
PAINLEVEL_OUTOF10: 7
PAINLEVEL_OUTOF10: 8

## 2019-05-16 ASSESSMENT — PAIN DESCRIPTION - LOCATION: LOCATION: CHEST

## 2019-05-16 ASSESSMENT — PAIN DESCRIPTION - ORIENTATION: ORIENTATION: LEFT

## 2019-05-16 ASSESSMENT — PAIN DESCRIPTION - PAIN TYPE: TYPE: SURGICAL PAIN

## 2019-05-16 NOTE — MISCELLANEOUS
[address] Tiny Isaac      May 15, 2019        Zachary Brunner, [address] [facility], [address] [place] [address]      RE:   Herschell Cowden  :  1955      Dear Dr. Emmie Jj:    I did place a VVI permanent pacemaker on Hortencia Rangel for his  tachybrady syndrome. He is in chronic atrial fibrillation, as you  know. We placed a Medtronic [place] MRI-compatible pacemaker without  difficulty with good thresholds. There were no complications. Hope he will do well long-term. Thank you very much.     Sincerely,        Ant Worrell    D: 05/15/2019 9:09:08       T: 05/15/2019 13:06:19  [id number]  Job#: [id number]     Doc#: [id number]    CC:

## 2019-05-16 NOTE — PROGRESS NOTES
SW met with pt to complete assessment during quality rounds. Pt lives with his spouse and does not utilize any community services at home currently. [facility]. Pt is scheduled to have back surgery on 6/19/19 at [facility]. Pt uses a cane to ambulate with. Pt follows with Dr. Elif Lazar as PCP and is a full code. Pt is able to afford his medications and this poses no discharge barrier. Pt reports that he is able to drive but spouse drives him to most appointments. Pt does not have advance directives and is not currently interested in obtaining information on these. Pt plans to return home and has a discharge order for today. Pt identifies no other discharge concerns or needs at this time.  Franco Gomez 5/16/2019

## 2019-05-16 NOTE — PROGRESS NOTES
Quality flow rounds held on 5/16/19     Lupe Ramos is admitted for pacemaker insertion. Length of stay 0. Education:    Needed Education: wound care, activity use of left arm, meds, follow up, pacemaker      Do you have any questions regarding your plan of care while at the hospital? denies    Planned Disposition:               [x]  Home when able                [] Swing Bed                [] ECF/SNF               [] Other/TBD    Barriers to Discharge:    Can you afford your medications? yes   Do you have transportation to follow up appointments? Drives self or wife drives   Do you need any new equipment at home? none   Current equipment includes   cane    Do you have a living will or durable power of  for healthcare? denies               If yes do we have a copy on file? N/A    Do you or your family have any questions or concerns we haven't already discussed? Denies    Tristan Melara and writer present for rounding.

## 2019-05-16 NOTE — PROGRESS NOTES
Cardiology    PPM site looks good. Hold Xarelto until Monday. Will see next Tuesday. CXR looks good.   Jadon Aguirre MD

## 2019-05-16 NOTE — PROGRESS NOTES
Dr. Josie Holley called back updated that patient HR is consistently going above 120 even as high as 150. Per Dr. Josie Holley give pt 25 mg lopressor PO now. Orders placed.

## 2019-05-16 NOTE — PROGRESS NOTES
DC instructions explained. Pt verbalizes understanding and all questions answered. Hard copy script given to patient and wife. All belongings packed and sent with patient.  Electronically signed by Rolf Arredondo RN on 5/16/2019 at 1:17 PM

## 2019-05-21 ENCOUNTER — OFFICE VISIT (OUTPATIENT)
Dept: CARDIOLOGY CLINIC | Age: 64
End: 2019-05-21

## 2019-05-21 DIAGNOSIS — I49.5 SSS (SICK SINUS SYNDROME) (HCC): Primary | ICD-10-CM

## 2019-05-21 PROCEDURE — 99024 POSTOP FOLLOW-UP VISIT: CPT | Performed by: INTERNAL MEDICINE

## 2019-05-21 RX ORDER — CARVEDILOL 3.12 MG/1
3.12 TABLET ORAL 2 TIMES DAILY WITH MEALS
Qty: 60 TABLET | Refills: 11 | Status: SHIPPED | OUTPATIENT
Start: 2019-05-21 | End: 2019-07-02 | Stop reason: SDUPTHER

## 2019-05-21 RX ORDER — CARVEDILOL 3.12 MG/1
3.12 TABLET ORAL 2 TIMES DAILY WITH MEALS
COMMUNITY
End: 2019-05-21 | Stop reason: SDUPTHER

## 2019-05-21 NOTE — PROGRESS NOTES
Jessi Lopez M.D. [address] [place] [address]  [phone]        May 21, 2019        Jannette Fernandez MD  [facility]  [address] [place] [address]     RE:   Emily Roca  :  1955    Dear Dr. Bettencourt [place]:    Brittany Dan[place]:  Pacemaker insertion on 05/15/2019. HISTORY OF PRESENT ILLNESS:  I met with Mr. Niels Groves to review his insertion site from his pacemaker on 05/15/2019. His incision has healed very nicely. His Steri-Strips were removed. There is no evidence of any infection. His lower rate is set at 60, upper rate at 130. He is pacing 90% of the time. His pain is under good control and he has no shortness of breath. He is walking without difficulty. He started Xarelto yesterday and is using 20 mg once a day. His medications today are Zyloprim 100 mg daily, Librium 25 mg p.r.n., digoxin 0.125 mg daily, Cardizem  mg b.i.d., iron one tablet daily, folic acid one tablet daily, Lasix 20 mg b.i.d., Norco p.r.n., Indocin 50 mg p.r.n., Imdur 30 mg half a tablet daily, magnesium 400 mg daily, Antivert p.r.n., Glucophage 500 mg daily, Protonix 40 mg b.i.d., Xarelto 20 mg daily, Zoloft 50 mg daily, thiamine 100 mg daily, Zanaflex 4 mg as needed. PHYSICAL EXAMINATION:   VITAL SIGNS:  His blood pressure today was 130/70 with a heart rate of 90 and irregularly irregular. Respirations were 18. GENERAL:  He is a pleasant 80-year-old gentleman. Denied pain. He was oriented to person, place and time. Answered questions appropriately. SKIN:  No unusual skin changes. HEENT:  The pupils are equally round and intact. Mucous membranes were dry. NECK:  No JVD. Good carotid pulses. No carotid bruits. No lymphadenopathy or thyromegaly. CARDIOVASCULAR EXAM:  S1 and S2 were normal.  No S3 or S4. Soft systolic blowing type murmur. No diastolic murmur.   PMI was normal.  No lift, thrust, or

## 2019-05-21 NOTE — LETTER
[facility] CARDIOLOGY SPECIALIST  [address]  [place] [address]  Dept: [phone]  Dept Fax: [phone]          5/21/19      To Whom it May Concern: In my opinion, from a cardiology standpoint, Rach Sales is cleared for surgery. May hold Xarelto five (5) days prior to procedure. If you have any questions, please feel free to call me at [phone].     Sincerely,          Marlo Gomez MD

## 2019-05-21 NOTE — PATIENT INSTRUCTIONS
Cleared for surgery   May hold Xarelto 20mg 5 (five)days prior to surgery   Will add Coreg 3.125 mg one tablet twice a day   Will follow up for bp check one week prior to surgery

## 2019-05-21 NOTE — PROGRESS NOTES
Ov Dr. Oscar Greco for one week follow up  S/p pacer one week ago   Also needing cardiac clearance for back surgery   June 19th Dr. Lianne Scott at [facility]  He can tell he is in and out of a fib    Cleared for surgery   May hold Xarelto 20mg 5 (five)days prior to surgery   Will add Coreg 3.125 mg one tablet twice a day   Will follow up for bp check one week prior to surgery

## 2019-05-22 ENCOUNTER — HOSPITAL ENCOUNTER (OUTPATIENT)
Age: 64
Discharge: HOME | End: 2019-05-22
Payer: COMMERCIAL

## 2019-05-22 LAB
ALBUMIN SERPL-MCNC: 3.6 G/DL (ref 3.5–5.2)
BUN BLDV-MCNC: 13 MG/DL (ref 8–23)
CALCIUM SERPL-MCNC: 8.8 MG/DL (ref 8.6–10.4)
CHLORIDE BLD-SCNC: 97 MMOL/L (ref 98–107)
CO2: 28 MMOL/L (ref 20–31)
CREAT SERPL-MCNC: 0.95 MG/DL (ref 0.7–1.2)
GFR AFRICAN AMERICAN: >60 ML/MIN
GFR NON-AFRICAN AMERICAN: >60 ML/MIN
GFR SERPL CREATININE-BSD FRML MDRD: NORMAL ML/MIN/{1.73_M2}
GFR SERPL CREATININE-BSD FRML MDRD: NORMAL ML/MIN/{1.73_M2}
PHOSPHORUS: 4.1 MG/DL (ref 2.5–4.5)
POTASSIUM SERPL-SCNC: 5 MMOL/L (ref 3.7–5.3)
SODIUM BLD-SCNC: 134 MMOL/L (ref 135–144)

## 2019-05-22 PROCEDURE — 84132 ASSAY OF SERUM POTASSIUM: CPT

## 2019-05-22 PROCEDURE — 82565 ASSAY OF CREATININE: CPT

## 2019-05-22 PROCEDURE — 82310 ASSAY OF CALCIUM: CPT

## 2019-05-22 PROCEDURE — 84520 ASSAY OF UREA NITROGEN: CPT

## 2019-05-22 PROCEDURE — 36415 COLL VENOUS BLD VENIPUNCTURE: CPT

## 2019-05-22 PROCEDURE — 82040 ASSAY OF SERUM ALBUMIN: CPT

## 2019-05-22 PROCEDURE — 82435 ASSAY OF BLOOD CHLORIDE: CPT

## 2019-05-22 PROCEDURE — 84295 ASSAY OF SERUM SODIUM: CPT

## 2019-05-22 PROCEDURE — 84100 ASSAY OF PHOSPHORUS: CPT

## 2019-05-22 PROCEDURE — 82374 ASSAY BLOOD CARBON DIOXIDE: CPT

## 2019-06-14 ENCOUNTER — OFFICE VISIT (OUTPATIENT)
Dept: CARDIOLOGY CLINIC | Age: 64
End: 2019-06-14
Payer: COMMERCIAL

## 2019-06-14 VITALS
OXYGEN SATURATION: 99 % | DIASTOLIC BLOOD PRESSURE: 60 MMHG | WEIGHT: 263 LBS | HEART RATE: 72 BPM | SYSTOLIC BLOOD PRESSURE: 120 MMHG | BODY MASS INDEX: 36.68 KG/M2

## 2019-06-14 DIAGNOSIS — E78.5 HYPERLIPIDEMIA, UNSPECIFIED HYPERLIPIDEMIA TYPE: ICD-10-CM

## 2019-06-14 DIAGNOSIS — E55.9 VITAMIN D DEFICIENCY DISEASE: ICD-10-CM

## 2019-06-14 DIAGNOSIS — I10 ESSENTIAL HYPERTENSION: Primary | ICD-10-CM

## 2019-06-14 DIAGNOSIS — I48.20 CHRONIC ATRIAL FIBRILLATION (HCC): ICD-10-CM

## 2019-06-14 PROCEDURE — 3017F COLORECTAL CA SCREEN DOC REV: CPT | Performed by: INTERNAL MEDICINE

## 2019-06-14 PROCEDURE — 99212 OFFICE O/P EST SF 10 MIN: CPT | Performed by: INTERNAL MEDICINE

## 2019-06-14 PROCEDURE — 1036F TOBACCO NON-USER: CPT | Performed by: INTERNAL MEDICINE

## 2019-06-14 PROCEDURE — G8428 CUR MEDS NOT DOCUMENT: HCPCS | Performed by: INTERNAL MEDICINE

## 2019-06-14 PROCEDURE — G8417 CALC BMI ABV UP PARAM F/U: HCPCS | Performed by: INTERNAL MEDICINE

## 2019-06-14 PROCEDURE — G8598 ASA/ANTIPLAT THER USED: HCPCS | Performed by: INTERNAL MEDICINE

## 2019-06-14 NOTE — PROGRESS NOTES
Ov Dr. Venessa Smart for follow up   Saw hematologists yesterday Dr. Micah Eubanks M[facility]   [facility]-and [facility] want him switched to apixaban(Eliquis)  Instead of xarelto   D/t less bleeding in stomach   Back surgery has been pushed back 2 weeks   Had a lot of blood drawn yesterday some will   Be sent off to specialty place

## 2019-06-17 NOTE — PROGRESS NOTES
Adriana Rahman M.D. [address]   [phone]      2019      RE:   Augustus Zuñiga  :  1955      Dear Doctor:    Mr. Hazel De Santiago came to our office on 2019 for evaluation and following our initiation of Coreg. I had seen him on 2019. At that time, he was still mildly tachycardic with a heart rate of 90. We instituted Coreg 3.125 mg b.i.d. and brought him back for a check up. He is pacing today at 60 beats per minute. He feels good. He has had no palpitations. He is being seen for fusion of L3-L4, which was scheduled for 2019. However, he has now seeing a hematologist as we had significant bleeding issues during his pacemaker insertion. He has been changed from Xarelto to Eliquis 5 mg b.i.d. He looks good today. His blood pressure is very nice at 120/60. He is being paced at 60 beats per minute. I have made no change in medications. We will see him in 6 months in followup. Again he is cleared for his surgery from my standpoint. Thank you very much.     Sincerely,        Sherman Rodriguez    D: 2019 10:49:34     T: 2019 12:04:00     [id number]  Job#: [id number]   Doc#: [id number]

## 2019-07-01 ENCOUNTER — TELEPHONE (OUTPATIENT)
Dept: CARDIOLOGY CLINIC | Age: 64
End: 2019-07-01

## 2019-07-02 RX ORDER — CARVEDILOL 6.25 MG/1
6.25 TABLET ORAL 2 TIMES DAILY WITH MEALS
Qty: 60 TABLET | Refills: 3 | Status: SHIPPED | OUTPATIENT
Start: 2019-07-02 | End: 2021-08-10

## 2019-07-09 ENCOUNTER — TELEPHONE (OUTPATIENT)
Dept: CARDIOLOGY CLINIC | Age: 64
End: 2019-07-09

## 2019-07-09 RX ORDER — DILTIAZEM HYDROCHLORIDE 180 MG/1
180 CAPSULE, EXTENDED RELEASE ORAL DAILY
COMMUNITY

## 2019-07-09 NOTE — TELEPHONE ENCOUNTER
Patient called stating that he has had an \"episode\" since having his ECHO. He would like Filipe Mejias to call him back, when she is available.  Thank you

## 2019-07-29 ENCOUNTER — TELEPHONE (OUTPATIENT)
Dept: CARDIOLOGY CLINIC | Age: 64
End: 2019-07-29

## 2019-09-27 ENCOUNTER — TELEPHONE (OUTPATIENT)
Dept: CARDIOLOGY CLINIC | Age: 64
End: 2019-09-27

## 2019-09-27 NOTE — LETTER
[facility] CARDIOLOGY SPECIALIST  [facility] [place] [facility] [address]  Dept: [phone]  Dept Fax: [phone]          9/27/19      To Whom it May Concern: In my opinion, from a cardiology standpoint, Jose De Jesus Clarke is cleared for surgery. He may hold his Eliquis 3 days prior to procedure. If you have any questions, please feel free to call me at [phone].     Sincerely,          Jhonatan Muro M.D.

## 2020-10-30 ENCOUNTER — PROCEDURE VISIT (OUTPATIENT)
Dept: CARDIOLOGY CLINIC | Age: 65
End: 2020-10-30
Payer: COMMERCIAL

## 2020-10-30 PROCEDURE — 93288 INTERROG EVL PM/LDLS PM IP: CPT | Performed by: INTERNAL MEDICINE

## 2020-10-30 NOTE — PROGRESS NOTES
Dimitrios Echevarria sent in [facility] pacemaker report. Reviewed by myself and Dr Rose Sanchez.

## 2021-01-19 PROCEDURE — 93288 INTERROG EVL PM/LDLS PM IP: CPT | Performed by: INTERNAL MEDICINE

## 2021-01-20 ENCOUNTER — PROCEDURE VISIT (OUTPATIENT)
Dept: CARDIOLOGY CLINIC | Age: 66
End: 2021-01-20
Payer: COMMERCIAL

## 2021-01-20 DIAGNOSIS — Z95.0 PACEMAKER: ICD-10-CM

## 2021-01-20 NOTE — PROGRESS NOTES
Huma Abrams sent in [address] [facility] pacemaker report. Reviewed by myself and Dr Genia Mace.

## 2021-06-22 DIAGNOSIS — I48.91 ATRIAL FIBRILLATION, UNSPECIFIED TYPE (HCC): ICD-10-CM

## 2021-06-22 DIAGNOSIS — Z13.6 ENCOUNTER FOR LIPID SCREENING FOR CARDIOVASCULAR DISEASE: ICD-10-CM

## 2021-06-22 DIAGNOSIS — E11.9 DIABETES MELLITUS WITHOUT COMPLICATION (HCC): ICD-10-CM

## 2021-06-22 DIAGNOSIS — E55.9 VITAMIN D DEFICIENCY: ICD-10-CM

## 2021-06-22 DIAGNOSIS — Z13.220 ENCOUNTER FOR LIPID SCREENING FOR CARDIOVASCULAR DISEASE: ICD-10-CM

## 2021-06-22 DIAGNOSIS — I49.5 SSS (SICK SINUS SYNDROME) (HCC): Primary | ICD-10-CM

## 2021-06-22 DIAGNOSIS — I10 ESSENTIAL HYPERTENSION: ICD-10-CM

## 2021-08-04 ENCOUNTER — HOSPITAL ENCOUNTER (OUTPATIENT)
Age: 66
Discharge: HOME | End: 2021-08-04
Payer: OTHER GOVERNMENT

## 2021-08-04 ENCOUNTER — HOSPITAL ENCOUNTER (OUTPATIENT)
Age: 66
Discharge: HOME | End: 2021-08-06
Payer: OTHER GOVERNMENT

## 2021-08-04 ENCOUNTER — HOSPITAL ENCOUNTER (OUTPATIENT)
Dept: GENERAL RADIOLOGY | Age: 66
Discharge: HOME | End: 2021-08-06
Payer: OTHER GOVERNMENT

## 2021-08-04 DIAGNOSIS — E11.9 DIABETES MELLITUS WITHOUT COMPLICATION (HCC): ICD-10-CM

## 2021-08-04 DIAGNOSIS — Z13.6 ENCOUNTER FOR LIPID SCREENING FOR CARDIOVASCULAR DISEASE: ICD-10-CM

## 2021-08-04 DIAGNOSIS — E55.9 VITAMIN D DEFICIENCY: ICD-10-CM

## 2021-08-04 DIAGNOSIS — Z13.220 ENCOUNTER FOR LIPID SCREENING FOR CARDIOVASCULAR DISEASE: ICD-10-CM

## 2021-08-04 DIAGNOSIS — I49.5 SSS (SICK SINUS SYNDROME) (HCC): ICD-10-CM

## 2021-08-04 DIAGNOSIS — I10 ESSENTIAL HYPERTENSION: ICD-10-CM

## 2021-08-04 DIAGNOSIS — I48.91 ATRIAL FIBRILLATION, UNSPECIFIED TYPE (HCC): ICD-10-CM

## 2021-08-04 LAB
ABSOLUTE EOS #: 0.1 K/UL (ref 0–0.4)
ABSOLUTE IMMATURE GRANULOCYTE: ABNORMAL K/UL (ref 0–0.3)
ABSOLUTE LYMPH #: 1.1 K/UL (ref 1–4.8)
ABSOLUTE MONO #: 0.3 K/UL (ref 0–1)
ALBUMIN SERPL-MCNC: 3.8 G/DL (ref 3.5–5.2)
ALBUMIN/GLOBULIN RATIO: ABNORMAL (ref 1–2.5)
ALP BLD-CCNC: 115 U/L (ref 40–129)
ALT SERPL-CCNC: 10 U/L (ref 5–41)
ANION GAP SERPL CALCULATED.3IONS-SCNC: 8 MMOL/L (ref 9–17)
AST SERPL-CCNC: 20 U/L
BASOPHILS # BLD: 1 % (ref 0–2)
BASOPHILS ABSOLUTE: 0 K/UL (ref 0–0.2)
BILIRUB SERPL-MCNC: 0.79 MG/DL (ref 0.3–1.2)
BUN BLDV-MCNC: 17 MG/DL (ref 8–23)
BUN/CREAT BLD: 21 (ref 9–20)
CALCIUM SERPL-MCNC: 8.4 MG/DL (ref 8.6–10.4)
CHLORIDE BLD-SCNC: 98 MMOL/L (ref 98–107)
CHOLESTEROL/HDL RATIO: 1.5
CHOLESTEROL: 119 MG/DL
CO2: 26 MMOL/L (ref 20–31)
CREAT SERPL-MCNC: 0.8 MG/DL (ref 0.7–1.2)
DIFFERENTIAL TYPE: YES
EOSINOPHILS RELATIVE PERCENT: 4 % (ref 0–5)
ESTIMATED AVERAGE GLUCOSE: 88 MG/DL
GFR AFRICAN AMERICAN: >60 ML/MIN
GFR NON-AFRICAN AMERICAN: >60 ML/MIN
GFR SERPL CREATININE-BSD FRML MDRD: ABNORMAL ML/MIN/{1.73_M2}
GFR SERPL CREATININE-BSD FRML MDRD: ABNORMAL ML/MIN/{1.73_M2}
GLUCOSE BLD-MCNC: 105 MG/DL (ref 70–99)
HBA1C MFR BLD: 4.7 % (ref 4–6)
HCT VFR BLD CALC: 37 % (ref 41–53)
HDLC SERPL-MCNC: 77 MG/DL
HEMOGLOBIN: 12.6 G/DL (ref 13.5–17.5)
IMMATURE GRANULOCYTES: ABNORMAL %
LDL CHOLESTEROL: 33 MG/DL (ref 0–130)
LYMPHOCYTES # BLD: 32 % (ref 13–44)
MAGNESIUM: 1.2 MG/DL (ref 1.6–2.6)
MCH RBC QN AUTO: 34.5 PG (ref 26–34)
MCHC RBC AUTO-ENTMCNC: 34.1 G/DL (ref 31–37)
MCV RBC AUTO: 101 FL (ref 80–100)
MONOCYTES # BLD: 10 % (ref 5–9)
NRBC AUTOMATED: ABNORMAL PER 100 WBC
PATIENT FASTING?: YES
PDW BLD-RTO: 14.8 % (ref 12.1–15.2)
PLATELET # BLD: 160 K/UL (ref 140–450)
PLATELET ESTIMATE: ABNORMAL
PMV BLD AUTO: ABNORMAL FL (ref 6–12)
POTASSIUM SERPL-SCNC: 4.9 MMOL/L (ref 3.7–5.3)
RBC # BLD: 3.66 M/UL (ref 4.5–5.9)
RBC # BLD: ABNORMAL 10*6/UL
SEG NEUTROPHILS: 53 % (ref 39–75)
SEGMENTED NEUTROPHILS ABSOLUTE COUNT: 1.8 K/UL (ref 2.1–6.5)
SODIUM BLD-SCNC: 132 MMOL/L (ref 135–144)
TOTAL PROTEIN: 6.5 G/DL (ref 6.4–8.3)
TRIGL SERPL-MCNC: 47 MG/DL
TSH SERPL DL<=0.05 MIU/L-ACNC: 1.63 MIU/L (ref 0.3–5)
VITAMIN D 25-HYDROXY: 53.5 NG/ML (ref 30–100)
VLDLC SERPL CALC-MCNC: NORMAL MG/DL (ref 1–30)
WBC # BLD: 3.4 K/UL (ref 3.5–11)
WBC # BLD: ABNORMAL 10*3/UL

## 2021-08-04 PROCEDURE — 82306 VITAMIN D 25 HYDROXY: CPT

## 2021-08-04 PROCEDURE — 85025 COMPLETE CBC W/AUTO DIFF WBC: CPT

## 2021-08-04 PROCEDURE — 93005 ELECTROCARDIOGRAM TRACING: CPT

## 2021-08-04 PROCEDURE — 80053 COMPREHEN METABOLIC PANEL: CPT

## 2021-08-04 PROCEDURE — 71046 X-RAY EXAM CHEST 2 VIEWS: CPT

## 2021-08-04 PROCEDURE — 83036 HEMOGLOBIN GLYCOSYLATED A1C: CPT

## 2021-08-04 PROCEDURE — 83735 ASSAY OF MAGNESIUM: CPT

## 2021-08-04 PROCEDURE — 80061 LIPID PANEL: CPT

## 2021-08-04 PROCEDURE — 84443 ASSAY THYROID STIM HORMONE: CPT

## 2021-08-04 PROCEDURE — 36415 COLL VENOUS BLD VENIPUNCTURE: CPT

## 2021-08-05 LAB
EKG ATRIAL RATE: 73 BPM
EKG Q-T INTERVAL: 478 MS
EKG QRS DURATION: 214 MS
EKG QTC CALCULATION (BAZETT): 530 MS
EKG R AXIS: -68 DEGREES
EKG T AXIS: 105 DEGREES
EKG VENTRICULAR RATE: 74 BPM

## 2021-08-05 PROCEDURE — 93010 ELECTROCARDIOGRAM REPORT: CPT | Performed by: INTERNAL MEDICINE

## 2021-08-10 ENCOUNTER — OFFICE VISIT (OUTPATIENT)
Dept: CARDIOLOGY CLINIC | Age: 66
End: 2021-08-10
Payer: OTHER GOVERNMENT

## 2021-08-10 VITALS
OXYGEN SATURATION: 96 % | DIASTOLIC BLOOD PRESSURE: 70 MMHG | HEART RATE: 77 BPM | SYSTOLIC BLOOD PRESSURE: 120 MMHG | WEIGHT: 237 LBS | BODY MASS INDEX: 33.05 KG/M2

## 2021-08-10 DIAGNOSIS — R06.02 SOB (SHORTNESS OF BREATH): ICD-10-CM

## 2021-08-10 DIAGNOSIS — I48.91 ATRIAL FIBRILLATION, UNSPECIFIED TYPE (HCC): ICD-10-CM

## 2021-08-10 DIAGNOSIS — Z13.6 ENCOUNTER FOR LIPID SCREENING FOR CARDIOVASCULAR DISEASE: ICD-10-CM

## 2021-08-10 DIAGNOSIS — E55.9 VITAMIN D DEFICIENCY: ICD-10-CM

## 2021-08-10 DIAGNOSIS — Z13.220 ENCOUNTER FOR LIPID SCREENING FOR CARDIOVASCULAR DISEASE: ICD-10-CM

## 2021-08-10 DIAGNOSIS — I49.5 SSS (SICK SINUS SYNDROME) (HCC): Primary | ICD-10-CM

## 2021-08-10 DIAGNOSIS — I10 ESSENTIAL HYPERTENSION: ICD-10-CM

## 2021-08-10 PROCEDURE — 99214 OFFICE O/P EST MOD 30 MIN: CPT | Performed by: INTERNAL MEDICINE

## 2021-08-10 RX ORDER — MAGNESIUM OXIDE 400 MG/1
400 TABLET ORAL 2 TIMES DAILY
COMMUNITY

## 2021-08-10 RX ORDER — CARVEDILOL 6.25 MG/1
6.25 TABLET ORAL 2 TIMES DAILY WITH MEALS
COMMUNITY
End: 2021-08-10

## 2021-08-10 RX ORDER — CARVEDILOL 3.12 MG/1
6.25 TABLET ORAL 2 TIMES DAILY WITH MEALS
COMMUNITY
End: 2021-09-09 | Stop reason: ALTCHOICE

## 2021-08-10 NOTE — PROGRESS NOTES
Ov DR Ross Chapman 2 yr follow up   Referred back from the [place]  No chest pain or sob  Has had back surgery done since   Dr last saw pt at the [place]  Pacemaker checked per medtronic. Bedside echo done. New grand daughter due in November. Celestina. To increase magnesium to 400 mg bid  Will do echo and call  Increase coreg to 6.125 mg bid  See in 1 mth with pacer check .   Then in [address] [place] [address]

## 2021-08-18 ENCOUNTER — HOSPITAL ENCOUNTER (OUTPATIENT)
Dept: NON INVASIVE DIAGNOSTICS | Age: 66
Discharge: HOME | End: 2021-08-18
Payer: OTHER GOVERNMENT

## 2021-08-18 DIAGNOSIS — R06.02 SOB (SHORTNESS OF BREATH): ICD-10-CM

## 2021-08-18 LAB
LV EF: 45 %
LVEF MODALITY: NORMAL

## 2021-08-18 PROCEDURE — 93306 TTE W/DOPPLER COMPLETE: CPT

## 2021-08-18 NOTE — PROGRESS NOTES
Tim Griffith M.D. [address] [place] [address] [facility] [address]  [phone]        August 10, 2021        Vangie Granados MD  [address], [address] [place] [address]    RE:   Iraida Stahl  :  1955    Dear Dr. Arlette Leroy:    CHIEF COMPLAINT:  1. Sick sinus syndrome. 2.  Chronic atrial fibrillation. 3.  Sick sinus syndrome, status post Rosa Isela XT SR MRI-compatible pacemaker implanted on 05/15/2019.  4. Anticoagulation with Eliquis 5 mg b.i.d. HISTORY OF PRESENT ILLNESS:  I had the pleasure of seeing Mr. Mariano Tony in our office on 08/10/2021. I last saw him on 2019. We had placed a pacemaker on 05/15/2019 for sick sinus syndrome. He did have significant bleeding issues during his pacemaker implantation and did see a hematologist who changed him from [address] [place] [address] to [address] [place] [address]. He does have a history of mild coronary artery disease. He had a catheterization in [address] [place] [address] in [place], which was unremarkable and a catheterization by myself on 10/10/2012 that showed 30% to 40% LAD with mild disease in the right coronary artery and circumflex, EF 50% to 55%. On 2017, he had tachycardia and a stress test showed EF of 43% and we did a catheterization on 2017, which was unchanged. His EF was 40%, medical therapy recommended. He has a long history of atrial fibrillation with a rapid ventricular response. He historically had difficulty with beta-blockers and therefore, he had been rate controlled with Cardizem and digoxin. However, he had a syncopal episode on 2019. He went to [facility] and was found to have a heart rate of 30. He also had hyperkalemia. This was corrected, but he continued to have episodes of lightheadedness. His heart rate was in the 30s and 40s and occasionally in the 120 to 130 range. At that time, he was drinking alcohol on a daily basis.     Because of his sick sinus syndrome, I did a VVI pacemaker on 05/15/2019. We have not seen him since 06/2019. He has done well since then. He did have back surgery on L3-L4 with laminectomy and fusion on 10/04/2019, which went well. He has subsequently stopped drinking alcohol except for very rare occasions. He has lost a significant amount of weight. He stays active and looks much improved from the last time I saw him. You have watched over his medical care very nicely and he denies any chest pain or chest discomfort. He has had no unusual shortness of breath. No PND, orthopnea or pedal edema. He was having some edema and you adjusted his diuretics. His rate has been overall controlled. He is treated for glucose intolerance and his hemoglobin A1c is outstanding at 5.7. He really has no complaints as I see him today and again, there is a remarkable difference in his overall attitude and his appearances from the last time I saw him with him now abstaining from alcohol. CARDIAC RISK FACTORS:  Known CAD:  Mildly positive. Hyperlipidemia:  Positive. Other Family Members:  Positive. Peripheral Vascular Disease:  Negative. Diabetes:  Positive. Smoking:  Negative. MEDICATIONS  AT THIS TIME:  He is on Zyloprim 100 mg daily, Eliquis 5 mg b.i.d., Coreg 3.125 mg b.i.d., Librium 25 mg daily, digoxin 0.125 mg daily, Cardizem  mg daily, iron 5 grains daily, folic acid 1 mg daily, Lasix 20 mg b.i.d., magnesium 400 mg daily, Glucophage 500 mg daily, Protonix 40 mg b.i.d., Zoloft 50 mg daily, thiamine 100 mg daily. PAST MEDICAL AND SURGICAL HISTORY:  1. Appendectomy and tonsillectomy as a child. 2.  Hernia repair in 2011.  3.  Back surgery in 1993.  4.  Hypertension. 5.  Diabetes for 16 years. 6.  Mild coronary artery disease with a catheterization on 06/21/2017, showing 30% to 40% LAD with unremarkable right coronary artery and circumflex, EF 40%.   7.  History of cirrhosis with him alcohol-free at this time.  8.  Left eye surgery and cannot close his left eye.  9.  History of mild renal insufficiency. 10.  History of anemia. 11.  L3-L4 laminectomy and fusion on 10/04/2019. FAMILY HISTORY:  Father had a CVA. Mother had an aneurysm. Brother had angina. SOCIAL HISTORY:  He is 63years old. He got  to his current wife, Nelsy Galaviz, in 11/2017. He has four children. Does not smoke. He does not drink alcohol at this time. He does have a history of alcoholism but is alcohol-free at this time. He does not exercise but stays active. REVIEW OF SYSTEMS:  Cardiac as above. Other systems reviewed including constitutional, eyes, ears, nose and throat, cardiovascular, respiratory, GI, , musculoskeletal, integumentary, neurologic, endocrine, hematologic and allergic/immunologic are negative except for what is described above. No weight loss or weight gain. No change in bowel habits. No blood in stools. No fevers, sweats or chills. PHYSICAL EXAMINATION:  VITAL SIGNS:  His blood pressure was 120/70 with a heart rate of 77 and regular. Respiratory rate 18. O2 saturation 97%. Weight 237 pounds. GENERAL:  He is a pleasant 62-year-old gentleman. Denied pain. He was oriented to person, place and time. Answered questions appropriately. SKIN:  No unusual skin changes. HEENT:  The pupils are equally round and intact. Mucous membranes were dry. NECK:  No JVD. Good carotid pulses. No carotid bruits. No lymphadenopathy or thyromegaly. CARDIOVASCULAR EXAM:  S1 and S2 were normal.  No S3 or S4. Soft systolic blowing type murmur. No diastolic murmur. PMI was normal.  No lift, thrust, or pericardial friction rub. LUNGS:  Fairly clear to auscultation and percussion. ABDOMEN:  Soft and nontender. Good bowel sounds. EXTREMITIES:  Good femoral pulses. Good pedal pulses. Trace pedal edema. Skin was warm and dry. No calf tenderness. Nail beds pink. Good cap refill.   PULSES:  Bilateral symmetrical radial, brachial and carotid pulses. No carotid bruits. Good femoral and pedal pulses. NEUROLOGIC EXAM:  Within normal limits. PSYCHIATRIC EXAM:  Within normal limits. LABORATORY DATA:  On 08/04/2021, sodium 132, potassium 4.9, BUN 17, creatinine 0.80, GFR greater than 60. Magnesium 1.2, glucose 105, calcium 8.4. Cholesterol 119 with an HDL 77, LDL 33, triglycerides 47. ALT was 10, AST was 20. Hemoglobin A1c was 4.7. TSH 1.63. Vitamin D 53.5. White count 3.4, hemoglobin 12.6 with a platelet count of 339,419. His EKG showed atrial fibrillation with ventricular paced rhythm. Chest x-ray was unremarkable with mild cardiomegaly. Pacemaker evaluation showed normal functioning VVIR pacemaker with him pacing 50% of the time. He did have 35 episodes of nonsustained ventricular tachycardia lasting up to 10 to 15 seconds up to 170 beats per minute. IMPRESSION:  1. Chronic atrial fibrillation, first discovered in 2009.  2.  Sick sinus syndrome with tachybradycardia with syncopal episodes in 05/2019.  3.  Status post VVI Medtronic [place] XT MRI-compatible pacemaker implanted on 05/15/2019.  4.  Mild coronary artery disease with a catheterization in 1999 in [place] as well as 10/10/2012 and 06/21/2017, all showing mild 30% to 40% disease in major vessels with an EF of 40%. 5.  History of elevated liver function tests, which are now normal, which had been due to alcoholism, with him now alcohol-free. 6.  History of mild anemia. 7.  History of mild LV dysfunction, EF in the 40% to 45% range. 8.  Normal functioning pacemaker with him ventricular pacing 50% of the time. 9.  Thirty five episodes of nonsustained ventricular tachycardia up to 170 beats per minute on interrogation of his pacemaker, which were asymptomatic. 10.  Hypomagnesemia. PLAN:  1.   We will increase his magnesium to 400 mg b.i.d.  2.  We will increase Coreg to 6.25 mg b.i.d.  3.  We will see in 1 month for a pacemaker

## 2021-08-23 ENCOUNTER — TELEPHONE (OUTPATIENT)
Dept: CARDIOLOGY CLINIC | Age: 66
End: 2021-08-23

## 2021-09-09 ENCOUNTER — OFFICE VISIT (OUTPATIENT)
Dept: CARDIOLOGY CLINIC | Age: 66
End: 2021-09-09
Payer: OTHER GOVERNMENT

## 2021-09-09 VITALS
DIASTOLIC BLOOD PRESSURE: 80 MMHG | SYSTOLIC BLOOD PRESSURE: 143 MMHG | OXYGEN SATURATION: 98 % | BODY MASS INDEX: 34.31 KG/M2 | WEIGHT: 246 LBS | HEART RATE: 79 BPM

## 2021-09-09 DIAGNOSIS — I48.11 LONGSTANDING PERSISTENT ATRIAL FIBRILLATION (HCC): Primary | ICD-10-CM

## 2021-09-09 PROCEDURE — 99214 OFFICE O/P EST MOD 30 MIN: CPT | Performed by: INTERNAL MEDICINE

## 2021-09-09 NOTE — PROGRESS NOTES
Ov Dr. Shyam Restrepo for follow up   Last visit mg was increased to 400 bid  And coreg to 6.125 bid     STOP COREG and switch to   Lopressor 25 mg one tablet twice a day     Call in 2 weeks with bp readings    Follow up in one month for pacer check only

## 2021-09-09 NOTE — PATIENT INSTRUCTIONS
STOP COREG and switch to   Lopressor 25 mg one tablet twice a day     Call in 2 weeks with bp readings    Follow up in one month for pacer check only

## 2021-09-15 NOTE — PROGRESS NOTES
Joshua Phan M.D. [address] [place] [address] Sneha Thapa   [phone]    2021    Jose Alberto Looney MD  [facility]  [address] [facility], [address] [place] [address]      RE:   Britt Gonzalez  :  1955      Dear Dr. Mcqueen [place]:    [address]:  History of nonsustained ventricular tachycardia found during his pacemaker interrogation on 08/10/2021, with his Coreg increased to 6.25 mg b.i.d. HISTORY OF PRESENT ILLNESS:  I met with Mr. Jenny Massey in the office on 2021. He is a pleasant 44-year-old gentleman who I saw on 08/10/2021. At that time we checked his pacemaker, which is a VVI pacemaker. It showed 35 episodes of nonsustained ventricular tachycardia, lasting up to 10 to 15 seconds, at 170 beats per minute. I increased his Coreg to 6.25 mg b.i.d. at that time. I brought him back today to re-interrogate his pacemaker. However, our nurse was out of the office sick, and therefore, I could not evaluate his pacemaker. He does have esophageal varices and his gastroenterologist was pleased that his beta-blocker had been increased. He denies any chest pain or chest discomfort. He has had no PND, orthopnea or pedal edema. No syncope or near syncope. He really has no complaints as I see him today. MEDICATIONS:  Coreg 6.25 mg b.i.d., allopurinol 100 mg daily, Eliquis 5 mg b.i.d., Librium 25 mg daily, digoxin 0.125 mg daily, Dilacor 180 mg daily, Lasix 20 mg b.i.d., magnesium 400 mg b.i.d., Glucophage 500 mg b.i.d., Protonix 40 mg b.i.d., Zoloft 50 mg daily, thiamine 100 mg daily. PHYSICAL EXAMINATION:  VITAL SIGNS:  His blood pressure was 156/92 with a heart rate of 79 and irregular. Respiratory rate 18. O2 saturation 98%. Weight 246 pounds. HEENT:  The pupils are equally round and intact. Mucous membranes were dry. NECK:  No JVD. Good carotid pulses. No carotid bruits.   No lymphadenopathy or thyromegaly. CARDIOVASCULAR EXAM:  S1 and S2 were normal.  No S3 or S4. Soft systolic blowing type murmur. No diastolic murmur. PMI was normal.  No lift, thrust, or pericardial friction rub. LUNGS:  Quite clear to auscultation and percussion. ABDOMEN:  Soft and nontender. Good bowel sounds. EXTREMITIES:  Good femoral pulses. Good pedal pulses. No pedal edema. Skin was warm and dry. No calf tenderness. Nail beds pink. Good cap refill. IMPRESSION:  1. Hypertension. 2.  Thirty five episodes of nonsustained ventricular tachycardia found with interrogation of his pacemaker on 08/10/2021, with his Coreg increased to 6.25 mg b.i.d.   3.  Chronic atrial fibrillation, on Eliquis. 4.  Esophageal varices. PLAN:  1. Stop Coreg. 2.  Start on Lopressor 25 mg b.i.d.  3.  He will call us in two weeks with blood pressure readings. 4.  We will follow up in one month for a pacemaker check only. DISCUSSION:  Mr. Paresh Lui overall is doing well. He was rather hypertensive today and he was still mildly tachycardic. He does have a pacemaker, and therefore, I will stop Coreg and switch to Lopressor 25 mg b.i.d. He will call us with his blood pressure readings in two weeks. We will bring him in one month to re-interrogate his pacemaker to see if he is still having his nonsustained ventricular tachycardia. If he is, we will increase his Lopressor again. Thank you very much for allowing me the privilege of seeing Mr. Paresh Lui. If you have any questions on my thoughts, please do not hesitate to contact me.     Sincerely,        Esteban Choi    D: 09/12/2021 20:35:37     T: 09/13/2021 6:14:59     [email or web address]  Job#: [id number]   Doc#: [id number]

## 2021-09-27 ENCOUNTER — TELEPHONE (OUTPATIENT)
Dept: CARDIOLOGY CLINIC | Age: 66
End: 2021-09-27

## 2021-09-27 RX ORDER — CARVEDILOL 6.25 MG/1
3.12 TABLET ORAL 2 TIMES DAILY
COMMUNITY

## 2021-09-27 NOTE — TELEPHONE ENCOUNTER
Pt called with bp readings and would like to go back on coreg instead of lopressor. Pt states lopressor causes headaches. He did not take lopressor x 2 days and did not have a headache.   PER > ZOHREH iyer to go back on coreg and d/c  Lopressor

## 2021-10-12 ENCOUNTER — NURSE ONLY (OUTPATIENT)
Dept: CARDIOLOGY CLINIC | Age: 66
End: 2021-10-12

## 2021-10-12 DIAGNOSIS — Z95.0 CARDIAC PACEMAKER IN SITU: Primary | ICD-10-CM

## 2022-03-01 ENCOUNTER — TELEPHONE (OUTPATIENT)
Dept: CARDIOLOGY CLINIC | Age: 67
End: 2022-03-01

## 2022-03-01 NOTE — TELEPHONE ENCOUNTER
Pt called c/o  Fluttering over the weekend  Tried to send a reading on his ICD monitor not working he call [employer] they will send him a new monitor. Pt brought in ICD check no new episodes  Had few short episodes in January   Reviewed with DR Bharath Felix no changes made.

## 2022-11-02 ENCOUNTER — TELEPHONE (OUTPATIENT)
Dept: CARDIOLOGY CLINIC | Age: 67
End: 2022-11-02

## 2022-11-02 NOTE — TELEPHONE ENCOUNTER
Received reading from [facility]  No event everything looks good  Pt waiting on clearance from [place] to make another appt with us.

## 2022-11-02 NOTE — TELEPHONE ENCOUNTER
Brenden Gallegos left a message to state that he sent a pacer check yesterday morning. He stated that he has been feeling vibration around his pacemaker and has felt tired and just doesn't feel well. He stated that he fell the other day and is worried that it could have done something to his pacemaker.     Please advise

## 2023-01-18 ENCOUNTER — TELEPHONE (OUTPATIENT)
Dept: CARDIOLOGY CLINIC | Age: 68
End: 2023-01-18

## 2023-01-18 DIAGNOSIS — I48.91 ATRIAL FIBRILLATION, UNSPECIFIED TYPE (HCC): Primary | ICD-10-CM

## 2023-01-18 DIAGNOSIS — I10 ESSENTIAL HYPERTENSION: ICD-10-CM

## 2023-01-18 NOTE — TELEPHONE ENCOUNTER
Donnell Toro called to ask for an appointment. He stated that he was in [facility] today with numbness in his right arm and blurred vision. He stated that they did a CXR and labs and was told to call here for an appointment. He stated that they did not do an EKG. I faxed to [facility] for these records, please advise after review.     Thank you,,

## 2023-01-19 NOTE — TELEPHONE ENCOUNTER
Will see pt 3-6 weeks - after getting over viral illness  Will get full set of labs/ekg at that time also

## 2023-02-27 ENCOUNTER — HOSPITAL ENCOUNTER (OUTPATIENT)
Age: 68
Discharge: HOME | End: 2023-02-27
Payer: OTHER GOVERNMENT

## 2023-02-27 DIAGNOSIS — I10 ESSENTIAL HYPERTENSION: ICD-10-CM

## 2023-02-27 DIAGNOSIS — I48.91 ATRIAL FIBRILLATION, UNSPECIFIED TYPE (HCC): ICD-10-CM

## 2023-02-27 LAB
ABSOLUTE EOS #: 0 K/UL (ref 0–0.4)
ABSOLUTE LYMPH #: 1.1 K/UL (ref 1–4.8)
ABSOLUTE MONO #: 0.4 K/UL (ref 0–1)
ALBUMIN SERPL-MCNC: 3.8 G/DL (ref 3.5–5.2)
ALP SERPL-CCNC: 89 U/L (ref 40–129)
ALT SERPL-CCNC: <5 U/L (ref 5–41)
ANION GAP SERPL CALCULATED.3IONS-SCNC: 7 MMOL/L (ref 9–17)
AST SERPL-CCNC: 13 U/L
BASOPHILS # BLD: 0 % (ref 0–2)
BASOPHILS ABSOLUTE: 0 K/UL (ref 0–0.2)
BILIRUB SERPL-MCNC: 0.5 MG/DL (ref 0.3–1.2)
BUN SERPL-MCNC: 21 MG/DL (ref 8–23)
BUN/CREAT BLD: 19 (ref 9–20)
CALCIUM SERPL-MCNC: 8.8 MG/DL (ref 8.6–10.4)
CHLORIDE SERPL-SCNC: 103 MMOL/L (ref 98–107)
CHOLEST SERPL-MCNC: 137 MG/DL
CHOLESTEROL/HDL RATIO: 2.3
CO2 SERPL-SCNC: 28 MMOL/L (ref 20–31)
CREAT SERPL-MCNC: 1.11 MG/DL (ref 0.7–1.2)
DIFFERENTIAL TYPE: YES
EKG ATRIAL RATE: 82 BPM
EKG Q-T INTERVAL: 440 MS
EKG QRS DURATION: 208 MS
EKG QTC CALCULATION (BAZETT): 517 MS
EKG R AXIS: -87 DEGREES
EKG T AXIS: 98 DEGREES
EKG VENTRICULAR RATE: 83 BPM
EOSINOPHILS RELATIVE PERCENT: 0 % (ref 0–5)
GFR SERPL CREATININE-BSD FRML MDRD: >60 ML/MIN/1.73M2
GLUCOSE SERPL-MCNC: 107 MG/DL (ref 70–99)
HCT VFR BLD AUTO: 40.4 % (ref 41–53)
HDLC SERPL-MCNC: 59 MG/DL
HGB BLD-MCNC: 13.2 G/DL (ref 13.5–17.5)
LDLC SERPL CALC-MCNC: 66 MG/DL (ref 0–130)
LYMPHOCYTES # BLD: 18 % (ref 13–44)
MAGNESIUM SERPL-MCNC: 1.5 MG/DL (ref 1.6–2.6)
MCH RBC QN AUTO: 33.3 PG (ref 26–34)
MCHC RBC AUTO-ENTMCNC: 32.6 G/DL (ref 31–37)
MCV RBC AUTO: 102 FL (ref 80–100)
MONOCYTES # BLD: 7 % (ref 5–9)
PATIENT FASTING?: YES
PDW BLD-RTO: 14.7 % (ref 12.1–15.2)
PLATELET # BLD AUTO: 184 K/UL (ref 140–450)
POTASSIUM SERPL-SCNC: 4.6 MMOL/L (ref 3.7–5.3)
PROT SERPL-MCNC: 6.7 G/DL (ref 6.4–8.3)
RBC # BLD: 3.96 M/UL (ref 4.5–5.9)
SEG NEUTROPHILS: 75 % (ref 39–75)
SEGMENTED NEUTROPHILS ABSOLUTE COUNT: 4.6 K/UL (ref 2.1–6.5)
SODIUM SERPL-SCNC: 138 MMOL/L (ref 135–144)
TRIGL SERPL-MCNC: 58 MG/DL
TSH SERPL-ACNC: 3.26 UIU/ML (ref 0.3–5)
WBC # BLD AUTO: 6.1 K/UL (ref 3.5–11)

## 2023-02-27 PROCEDURE — 85025 COMPLETE CBC W/AUTO DIFF WBC: CPT

## 2023-02-27 PROCEDURE — 36415 COLL VENOUS BLD VENIPUNCTURE: CPT

## 2023-02-27 PROCEDURE — 93010 ELECTROCARDIOGRAM REPORT: CPT | Performed by: INTERNAL MEDICINE

## 2023-02-27 PROCEDURE — 84443 ASSAY THYROID STIM HORMONE: CPT

## 2023-02-27 PROCEDURE — 83735 ASSAY OF MAGNESIUM: CPT

## 2023-02-27 PROCEDURE — 93005 ELECTROCARDIOGRAM TRACING: CPT

## 2023-02-27 PROCEDURE — 80053 COMPREHEN METABOLIC PANEL: CPT

## 2023-02-27 PROCEDURE — 80061 LIPID PANEL: CPT

## 2023-02-28 ENCOUNTER — OFFICE VISIT (OUTPATIENT)
Dept: CARDIOLOGY CLINIC | Age: 68
End: 2023-02-28
Payer: MEDICARE

## 2023-02-28 ENCOUNTER — HOSPITAL ENCOUNTER (OUTPATIENT)
Age: 68
Discharge: HOME | End: 2023-03-02
Payer: OTHER GOVERNMENT

## 2023-02-28 ENCOUNTER — HOSPITAL ENCOUNTER (OUTPATIENT)
Dept: GENERAL RADIOLOGY | Age: 68
Discharge: HOME | End: 2023-03-02
Payer: OTHER GOVERNMENT

## 2023-02-28 VITALS
HEART RATE: 92 BPM | DIASTOLIC BLOOD PRESSURE: 70 MMHG | OXYGEN SATURATION: 96 % | WEIGHT: 258 LBS | BODY MASS INDEX: 35.98 KG/M2 | SYSTOLIC BLOOD PRESSURE: 120 MMHG

## 2023-02-28 DIAGNOSIS — R06.02 SOB (SHORTNESS OF BREATH): Primary | ICD-10-CM

## 2023-02-28 DIAGNOSIS — R07.9 CHEST PAIN, UNSPECIFIED TYPE: ICD-10-CM

## 2023-02-28 DIAGNOSIS — R06.02 SOB (SHORTNESS OF BREATH): ICD-10-CM

## 2023-02-28 PROCEDURE — G8484 FLU IMMUNIZE NO ADMIN: HCPCS | Performed by: INTERNAL MEDICINE

## 2023-02-28 PROCEDURE — 3078F DIAST BP <80 MM HG: CPT | Performed by: INTERNAL MEDICINE

## 2023-02-28 PROCEDURE — 1123F ACP DISCUSS/DSCN MKR DOCD: CPT | Performed by: INTERNAL MEDICINE

## 2023-02-28 PROCEDURE — G8419 CALC BMI OUT NRM PARAM NOF/U: HCPCS | Performed by: INTERNAL MEDICINE

## 2023-02-28 PROCEDURE — 3017F COLORECTAL CA SCREEN DOC REV: CPT | Performed by: INTERNAL MEDICINE

## 2023-02-28 PROCEDURE — 1036F TOBACCO NON-USER: CPT | Performed by: INTERNAL MEDICINE

## 2023-02-28 PROCEDURE — 99214 OFFICE O/P EST MOD 30 MIN: CPT | Performed by: INTERNAL MEDICINE

## 2023-02-28 PROCEDURE — G8427 DOCREV CUR MEDS BY ELIG CLIN: HCPCS | Performed by: INTERNAL MEDICINE

## 2023-02-28 PROCEDURE — 71046 X-RAY EXAM CHEST 2 VIEWS: CPT

## 2023-02-28 PROCEDURE — 3074F SYST BP LT 130 MM HG: CPT | Performed by: INTERNAL MEDICINE

## 2023-02-28 RX ORDER — DIGOXIN 250 MCG
250 TABLET ORAL DAILY
COMMUNITY

## 2023-02-28 NOTE — PROGRESS NOTES
Ov DR Simon Padgett follow up   C/o pain rt arm with exertion   C/o some chest discomfort   Worse with exertion   Off and on for 4 mths. Some sob past 3 mths. C/o blurred vision seen   Eye dr no issues. Tires easily. Just finished antibiotics  For bronchitis. Not drank for 2 mths. Was in ED for CHF     Bedside echo done. Will do cxr today    Will do lexiscan and echo .   See pt

## 2023-03-01 NOTE — PROGRESS NOTES
Valentin Sánchez M.D. [address] [place] [address] Sneha Thapa   [phone]          2023          Gualberto Landin MD  [address], [address] [place] [address]      RE:   Evy Vaughan  :  1955      Dear Dr. Domenico Dow:    Maribel Pa:  1. Chronic atrial fibrillation. 2.  Sick sinus syndrome status post Jane XT SR MRI-compatible pacemaker implanted on 05/15/2019.  3. Anticoagulated with Eliquis 5 mg b.i.d.  4.  New onset of shortness of breath with exertion along with some arm discomfort. HISTORY OF PRESENT ILLNESS:  I had the pleasure of seeing Mr. Evy Vaughan in our office on 2023. He is a pleasant 72-year-old gentleman who had a pacemaker implanted on 05/15/2019, for sick sinus syndrome. He had significant bleeding issues during the pacemaker implantation and saw a hematologist, who changed him from [address] [place] [address] to [address] [place] [address]. He had a catheterization in [address] [place] [address] in [place], which was unremarkable, and a catheterization by myself on 10/10/2012, that showed 30% to 40% LAD with mild disease in the right coronary artery and circumflex, EF of 55% to 60%. On 2017, he had a stress test that showed an EF of 43%. We did a catheterization on 2017, which was unchanged. His EF was 40%, medical therapy recommended. He has a long history of chronic atrial fibrillation and had a rapid ventricular response. He had difficulty with beta-blockers and we have had rate control with Cardizem and digoxin. He had a syncopal episode on 2019, went to [facility] and was found to have a heart rate of 30. He continued to have rates occasionally in the 30s to 40s and occasionally in the 120 to 130 range. I did a VVI permanent pacemaker on 05/15/2019. He did have back surgery in L3-L4 with laminectomy and fusion on 10/04/2019.     He did have bronchitis and just finished antibiotics. He was in the emergency room at [facility] on 01/18, and was thought to be in some CHF. He has had progressive shortness of breath with exertion over the past four months. This is worse in the last several months. His energy level has also significantly changed in the last two months. He has developed pain in his right arm, although it is not necessarily with activity and can also be at night. He has had no syncope or near syncope. He is not drinking alcohol for approximately two months. CARDIAC RISK FACTORS:  Known CAD:  Mildly positive. Hyperlipidemia:  Positive. Other Family Members:  Positive. Peripheral Vascular Disease:  Negative. Diabetes:  Positive. Smoking:  Negative. MEDICATIONS AT THIS TIME:  He is on Zyloprim 100 mg daily, Eliquis 5 mg b.i.d., Coreg 3.125 mg b.i.d., Librium 25 mg daily, digoxin 0.25 mg daily, Dilacor  mg daily, Ferrocite daily, folic acid 1 mg daily, Lasix 20 mg b.i.d., magnesium 400 mg b.i.d., Glucophage 500 mg daily, Protonix 40 mg b.i.d., Zoloft 50 mg daily, and thiamine 100 mg daily. PAST MEDICAL AND SURGICAL HISTORY:  1. Appendectomy and tonsillectomy as a child. 2.  Hernia repair in 2011.  3.  Back surgery in 1993.  4.  Hypertension. 5.  Diabetes for 17 years. 6.  Mild coronary artery disease as stated previously. 7.  Cirrhosis, with him not drinking for the last two months. 8.  Left eye surgery and cannot close his right eye.  9.  History of anemia although that is doing well. 10.  L3-L4 laminectomy and fusion on 10/04/2019. FAMILY HISTORY:  Father had a CVA. Mother had an aneurysm. Brother had angina. SOCIAL HISTORY:  He is 74years old. Got  to his current wife, Denisse Klein, in 11/2017. He has four children. Does not smoke. Had been alcohol-free for two months. REVIEW OF SYSTEMS:  Cardiac as above.   Other systems reviewed including constitutional, eyes, ears, nose and throat, cardiovascular, respiratory, GI, , musculoskeletal, integumentary, neurologic, endocrine, hematologic and allergic/immunologic are negative except for what is described above. No weight loss or weight gain. No change in bowel habits. No blood in stools. No fevers, sweats or chills. PHYSICAL EXAMINATION:  VITAL SIGNS:  His blood pressure was 120/70 with a heart rate of 92 and irregular. Respirations 18. O2 sat 97%. Weight 258 pounds. GENERAL:  He is a pleasant 51-year-old gentleman. Denied pain. He was oriented to person, place and time. Answered questions appropriately. SKIN:  No unusual skin changes. HEENT:  The pupils are equally round and intact. Mucous membranes were dry. NECK:  No JVD. Good carotid pulses. No carotid bruits. No lymphadenopathy or thyromegaly. CARDIOVASCULAR EXAM:  S1 and S2 were normal.  No S3 or S4. Soft systolic blowing type murmur. No diastolic murmur. PMI was normal.  No lift, thrust, or pericardial friction rub. LUNGS:  Clear to auscultation and percussion. ABDOMEN:  Soft and nontender. Good bowel sounds. EXTREMITIES:  Good femoral pulses. Good pedal pulses. No pedal edema. Skin was warm and dry. No calf tenderness. Nail beds pink. Good cap refill. PULSES:  Bilateral symmetrical radial, brachial and carotid pulses. No carotid bruits. Good femoral and pedal pulses. NEUROLOGIC EXAM:  Within normal limits. PSYCHIATRIC EXAM:  Within normal limits. LABORATORY DATA:  Sodium was 138, potassium 4.6, BUN 21, creatinine 1.11. His GFR greater than 60, magnesium 1.5, glucose 107, calcium was 8.8. Cholesterol 137, HDL 59, LDL of 66, triglycerides 58. His ALT was 5, AST was 13. TSH was 3.26. White count 6.1, hemoglobin 13.2 with a platelet count 608,234. His EKG showed atrial fibrillation with a ventricular paced rhythm.     IMPRESSION:  1.  Marked shortness of breath, loss of energy along with some chest and right arm pain for the last four months, worse in the last two months. 2.  Chronic atrial fibrillation, discovered in 2009. 3.  Sick sinus syndrome with a syncopal episode in 04/2019. 4. VVI [employer] [place] XT MRI-compatible pacemaker implanted on 05/15/2019.  5.  Catheterization in [address] [place] [address] in [place] as well as 10/10/2012, and 06/21/2017; they all showed mild 30% to 40% disease in the major vessels, EF of 40%. 6.  Normal liver function tests, now that he has remained alcohol-free for two months. 7.  Normal-functioning pacemaker. 8.  Hypomagnesemia. PLAN:  1. Lexiscan Cardiolite stress test.  2.  Echocardiogram.  3.  We will meet with him to go over the results. 4.  Chest x-ray today. DISCUSSION:  Mr. Teresa Cornejo has had a marked change in his activity level in the last four months and worse in the last two months. He has marked shortness of breath with any activity, which is a new finding. He was in the ER at [facility] on 01/18, and did have some small pleural effusions. I will do a chest x-ray today so that we will make sure that there is no evidence of CHF. We will do an echo and a Lexiscan stress test and see if there is any change from previous testing. If there has been a change or if his shortness of breath continues, I would consider repeating his cardiac catheterization as it has been 6 years since his last one. Thank you very much for allowing me the privilege of seeing Tobe Gaucher. If you have any questions on my thoughts, please do not hesitate to contact me.     Sincerely,        Concetta Root MD    D: 02/28/2023 14:03:12     T: 02/28/2023 14:09:48     [id number]  Job#: [id number]   Doc#: [id number]

## 2023-03-16 ENCOUNTER — HOSPITAL ENCOUNTER (OUTPATIENT)
Dept: NON INVASIVE DIAGNOSTICS | Age: 68
Discharge: HOME | End: 2023-03-16
Payer: MEDICARE

## 2023-03-16 ENCOUNTER — HOSPITAL ENCOUNTER (OUTPATIENT)
Dept: NUCLEAR MEDICINE | Age: 68
Discharge: HOME | End: 2023-03-18
Payer: MEDICARE

## 2023-03-16 ENCOUNTER — HOSPITAL ENCOUNTER (OUTPATIENT)
Dept: NUCLEAR MEDICINE | Age: 68
Discharge: HOME | End: 2023-03-18

## 2023-03-16 VITALS — DIASTOLIC BLOOD PRESSURE: 90 MMHG | SYSTOLIC BLOOD PRESSURE: 142 MMHG | HEART RATE: 72 BPM

## 2023-03-16 DIAGNOSIS — R07.9 CHEST PAIN, UNSPECIFIED TYPE: ICD-10-CM

## 2023-03-16 DIAGNOSIS — R06.02 SOB (SHORTNESS OF BREATH): ICD-10-CM

## 2023-03-16 LAB
LV EF: 45 %
LVEF MODALITY: NORMAL

## 2023-03-16 PROCEDURE — 3430000000 HC RX DIAGNOSTIC RADIOPHARMACEUTICAL: Performed by: INTERNAL MEDICINE

## 2023-03-16 PROCEDURE — 93017 CV STRESS TEST TRACING ONLY: CPT

## 2023-03-16 PROCEDURE — 6360000002 HC RX W HCPCS: Performed by: INTERNAL MEDICINE

## 2023-03-16 PROCEDURE — A9500 TC99M SESTAMIBI: HCPCS | Performed by: INTERNAL MEDICINE

## 2023-03-16 PROCEDURE — 2580000003 HC RX 258: Performed by: INTERNAL MEDICINE

## 2023-03-16 PROCEDURE — 93306 TTE W/DOPPLER COMPLETE: CPT

## 2023-03-16 PROCEDURE — 78452 HT MUSCLE IMAGE SPECT MULT: CPT

## 2023-03-16 RX ORDER — AMINOPHYLLINE DIHYDRATE 25 MG/ML
50 INJECTION, SOLUTION INTRAVENOUS
Status: DISCONTINUED | OUTPATIENT
Start: 2023-03-16 | End: 2023-03-17 | Stop reason: HOSPADM

## 2023-03-16 RX ORDER — BUMETANIDE 2 MG/1
2 TABLET ORAL DAILY
COMMUNITY
Start: 2019-12-22

## 2023-03-16 RX ORDER — TECHNETIUM TC-99M SESTAMIBI 1 MG/10ML
10 INJECTION INTRAVENOUS
Status: COMPLETED | OUTPATIENT
Start: 2023-03-16 | End: 2023-03-16

## 2023-03-16 RX ORDER — AMINOPHYLLINE DIHYDRATE 25 MG/ML
100 INJECTION, SOLUTION INTRAVENOUS
Status: DISCONTINUED | OUTPATIENT
Start: 2023-03-16 | End: 2023-03-17 | Stop reason: HOSPADM

## 2023-03-16 RX ORDER — 0.9 % SODIUM CHLORIDE 0.9 %
10 VIAL (ML) INJECTION PRN
Status: DISCONTINUED | OUTPATIENT
Start: 2023-03-16 | End: 2023-03-17 | Stop reason: HOSPADM

## 2023-03-16 RX ORDER — ALLOPURINOL 300 MG/1
200 TABLET ORAL DAILY
COMMUNITY
End: 2023-03-16

## 2023-03-16 RX ORDER — TECHNETIUM TC-99M SESTAMIBI 1 MG/10ML
30 INJECTION INTRAVENOUS
Status: COMPLETED | OUTPATIENT
Start: 2023-03-16 | End: 2023-03-16

## 2023-03-16 RX ADMIN — TETRAKIS(2-METHOXYISOBUTYLISOCYANIDE)COPPER(I) TETRAFLUOROBORATE 30 MILLICURIE: 1 INJECTION, POWDER, LYOPHILIZED, FOR SOLUTION INTRAVENOUS at 09:10

## 2023-03-16 RX ADMIN — TETRAKIS(2-METHOXYISOBUTYLISOCYANIDE)COPPER(I) TETRAFLUOROBORATE 10 MILLICURIE: 1 INJECTION, POWDER, LYOPHILIZED, FOR SOLUTION INTRAVENOUS at 09:10

## 2023-03-16 RX ADMIN — SODIUM CHLORIDE, PRESERVATIVE FREE 10 ML: 5 INJECTION INTRAVENOUS at 10:26

## 2023-03-16 RX ADMIN — REGADENOSON 0.4 MG: 0.08 INJECTION, SOLUTION INTRAVENOUS at 10:26

## 2023-03-16 NOTE — PROGRESS NOTES
Lexiscan administered, pt experiences shortness of breath. 10:30 Pt report little headache, denies chest pain. 10:33 This part of test completed. Pt given snack and beverage.

## 2023-03-17 NOTE — PROCEDURES
[place] [address]                              CARDIAC STRESS TEST    PATIENT NAME: Darlene Ramsey                   :        1955  MED REC NO:   [id number]                              ROOM:  ACCOUNT NO:   [de-identified]                           ADMIT DATE: 2023  PROVIDER:     Nazario Mcdaniels MD      DATE OF STUDY:  2023    Cardiovascular Diagnostics Department    Ordering Provider:  Cheryl Cam MD    Primary Care Provider:  Lenka Barrera MD    Interpreting Physician:   Vikki Sibley. Joshua Hatch MD    MYOCARDIAL PERFUSION STRESS IMAGING    The stress ECG results are reported separately. NUCLEAR IMAGING RESULTS:  The overall quality of the study is fair. Mild attenuation artifact was seen. There is no evidence of abnormal  lung uptake. Additionally, the right ventricle appears enlarged. The  left ventricular cavity is noted to be enlarged in size on stress  images. There is no evidence of transient ischemic dilatation (TID) of  the left ventricle. Gated SPECT imaging demonstrates global hypokinesis with akinetic apex. The calculated left ventricular ejection fraction was 32%. The rest images demonstrated a moderate to large perfusion abnormality  of moderate to severe intensity in the inferior, apical, anteroapical  and inferoapical regions. On stress imaging, a moderate to large perfusion abnormality of moderate  to severe intensity was noted in the inferior, apical, anteroapical and  inferoapical regions. IMPRESSION:  1.  Markedly abnormal myocardial perfusion study. There is a moderate  to large perfusion defect of moderate to severe intensity in the  inferior, apical, anteroapical and inferoapical regions during stress  and rest imaging, which is most consistent with an old myocardial  infarction with cheyenne-infarct ischemia.     2.  Global left ventricular systolic function was abnormal with an  ejection fraction of 32%, with regional wall motion abnormalities. 3.  Increased right ventricular update consistent with significant  pulmonary hypertension. Overall these results are most consistent with a high risk for  significant coronary artery disease. Additional testing including cardiac catheterization may be indicated. The results of this test were discussed with Dr. Sheri Ponce on 3/16/23.         Nyla Hernandez MD    D: 03/17/2023 9:41:48       T: 03/17/2023 9:44:30     [email or web address]  Job#: [id number]     Doc#: Unknown    CC:  MD Gilson Moser

## 2023-03-19 NOTE — PROCEDURES
[place] [address]                              CARDIAC STRESS TEST    PATIENT NAME: Jose Alberto Rincon                   :        1955  MED REC NO:   [id number]                              ROOM:  ACCOUNT NO:   [de-identified]                           ADMIT DATE: 2023  PROVIDER:     Emily Choudhary MD      DATE OF STUDY:  2023      LEXISCAN CARDIOLITE STRESS TEST:    INDICATION:  Chest pain. IMPRESSION:  1. We gave 0.4 mg of Lexiscan intravenously. 2.  This was followed in 20 seconds by Cardiolite infusion. 3.  There was no chest pain. 4.  There was no ST depression. 5.  It was an overall negative Lexiscan stress test.  6.  Cardiolite to follow.         Eliceo Aguirre MD    D: 2023 7:14:00       T: 2023 10:43:58     [id number]  Job#: [id number]     Doc#: [id number]    CC:  Noemi Sweet

## 2023-03-21 DIAGNOSIS — R07.9 CHEST PAIN, UNSPECIFIED TYPE: ICD-10-CM

## 2023-03-21 DIAGNOSIS — I48.91 ATRIAL FIBRILLATION, UNSPECIFIED TYPE (HCC): ICD-10-CM

## 2023-03-21 DIAGNOSIS — R06.02 SOB (SHORTNESS OF BREATH): Primary | ICD-10-CM

## 2023-03-22 ENCOUNTER — OFFICE VISIT (OUTPATIENT)
Dept: CARDIOLOGY CLINIC | Age: 68
End: 2023-03-22
Payer: OTHER GOVERNMENT

## 2023-03-22 VITALS
RESPIRATION RATE: 96 BRPM | WEIGHT: 248 LBS | SYSTOLIC BLOOD PRESSURE: 147 MMHG | BODY MASS INDEX: 34.59 KG/M2 | HEART RATE: 88 BPM | DIASTOLIC BLOOD PRESSURE: 88 MMHG

## 2023-03-22 DIAGNOSIS — R06.02 SOB (SHORTNESS OF BREATH): Primary | ICD-10-CM

## 2023-03-22 PROCEDURE — 99213 OFFICE O/P EST LOW 20 MIN: CPT | Performed by: INTERNAL MEDICINE

## 2023-03-22 RX ORDER — PREDNISONE 20 MG/1
TABLET ORAL
Qty: 6 TABLET | Refills: 0 | Status: SHIPPED | OUTPATIENT
Start: 2023-03-22

## 2023-03-22 RX ORDER — ALBUTEROL SULFATE 90 UG/1
2 AEROSOL, METERED RESPIRATORY (INHALATION) EVERY 6 HOURS PRN
COMMUNITY

## 2023-03-22 RX ORDER — PREDNISONE 20 MG/1
TABLET ORAL
Qty: 6 TABLET | Refills: 0 | Status: CANCELLED | OUTPATIENT
Start: 2023-03-22

## 2023-03-22 RX ORDER — DIGOXIN 125 MCG
125 TABLET ORAL DAILY
Qty: 30 TABLET | Refills: 11 | Status: SHIPPED | OUTPATIENT
Start: 2023-03-22

## 2023-03-22 NOTE — PROGRESS NOTES
Ov Dr. Raaht Rubio for follow up  Here to review stress test   Did not bring med list   \"I can tell you what I am taking\"   More sob since last visit - doing more   Since weather is better -  Helped unload wood yesterday   Currently on Zpak for cough/cold   Today is second day     Will proceed with L and R heart cath   On April 28th    Last dose of Eliquis on April 24    Will sent in Rx for Prednisone 20 mg   3 tablet night prior to cath and 3 tablet   Am of cath     Will add Digoxin 125 MCG three days a week   (Bwaeyu-Kqezwityp-Lgckkq)  Bottle will say daily

## 2023-03-22 NOTE — PATIENT INSTRUCTIONS
Will proceed with L and R heart cath   On April 28th    Last dose of Eliquis on April 24    Will sent in Rx for Prednisone 20 mg   3 tablet night prior to cath and 3 tablet   Am of cath     Will add Digoxin 125 MCG three days a week   (Xlamlx-Rzjpdkygd-Wgzbmw)  Bottle will say daily

## 2023-03-23 NOTE — PROGRESS NOTES
Angel Smith M.D. [address] [place] [address] Sneha Thapa [address]  [phone]          2023          Nova Lutz MD  [address], [address] [place] [address]      RE:   Kyler Andrea  :  1955      Dear Dr. Milla Almazan:    CHIEF COMPLAINT:  1.  Marked shortness of breath and loss of energy consistent with angina. 2.  Abnormal Lexiscan Cardiolite stress test with a large area of anterior apical and inferior apical regions during stress and rest, most consistent with an old myocardial infarction with cheyenne-infarct ischemia. HISTORY OF PRESENT ILLNESS:  I met Kyler Andrea and his wife, Black Jones, on 2023. I trust you received my full H and P from 2023. As you know, he has had increasing shortness of breath and loss of energy along with some arm discomfort consistent with angina. He had a cardiac catheterization on 2017, which was unchanged with an EF of 40%, medical therapy recommended. He also had an echocardiogram on 2023, that showed an ejection fraction of 45% with moderate-to-severe pulmonary hypertension at 54 mmHg with mild aortic stenosis. I went over the stress test with him and his wife and I showed them the abnormality. Because of his increasing symptoms, I would recommend proceeding on with a left and right cardiac catheterization. He has mild aortic stenosis and also has pulmonary hypertension at 54 mmHg and therefore, we will do a right heart catheterization along with his left heart catheterization. We will pre-treat him for dye allergy. Of note, you did blood work and his digoxin level was low at 0.4. Therefore, we will increase his digoxin from 0.25 mg daily along with 0.125 mg three days a week. I would recommend doing another digoxin level in approximately three weeks after he starts the 0.125 mg tablets.     In addition, it has been noted

## 2023-04-24 ENCOUNTER — HOSPITAL ENCOUNTER (OUTPATIENT)
Age: 68
Discharge: HOME | End: 2023-04-24
Payer: OTHER GOVERNMENT

## 2023-04-24 DIAGNOSIS — I48.91 ATRIAL FIBRILLATION, UNSPECIFIED TYPE (HCC): ICD-10-CM

## 2023-04-24 DIAGNOSIS — R07.9 CHEST PAIN, UNSPECIFIED TYPE: ICD-10-CM

## 2023-04-24 DIAGNOSIS — R06.02 SOB (SHORTNESS OF BREATH): ICD-10-CM

## 2023-04-24 LAB
ABSOLUTE EOS #: 0.2 K/UL (ref 0–0.4)
ABSOLUTE LYMPH #: 0.9 K/UL (ref 1–4.8)
ABSOLUTE MONO #: 0.4 K/UL (ref 0–1)
ALBUMIN SERPL-MCNC: 3.9 G/DL (ref 3.5–5.2)
ALP SERPL-CCNC: 85 U/L (ref 40–129)
ALT SERPL-CCNC: 7 U/L (ref 5–41)
ANION GAP SERPL CALCULATED.3IONS-SCNC: 7 MMOL/L (ref 9–17)
AST SERPL-CCNC: 14 U/L
BASOPHILS # BLD: 1 % (ref 0–2)
BASOPHILS ABSOLUTE: 0 K/UL (ref 0–0.2)
BILIRUB SERPL-MCNC: 0.9 MG/DL (ref 0.3–1.2)
BUN SERPL-MCNC: 10 MG/DL (ref 8–23)
BUN/CREAT BLD: 11 (ref 9–20)
CALCIUM SERPL-MCNC: 9.1 MG/DL (ref 8.6–10.4)
CHLORIDE SERPL-SCNC: 105 MMOL/L (ref 98–107)
CHOLEST SERPL-MCNC: 124 MG/DL
CHOLESTEROL/HDL RATIO: 2.1
CO2 SERPL-SCNC: 26 MMOL/L (ref 20–31)
CREAT SERPL-MCNC: 0.9 MG/DL (ref 0.7–1.2)
DIFFERENTIAL TYPE: YES
EKG ATRIAL RATE: 86 BPM
EKG P AXIS: 88 DEGREES
EKG Q-T INTERVAL: 428 MS
EKG QRS DURATION: 164 MS
EKG QTC CALCULATION (BAZETT): 505 MS
EKG R AXIS: -77 DEGREES
EKG T AXIS: 107 DEGREES
EKG VENTRICULAR RATE: 84 BPM
EOSINOPHILS RELATIVE PERCENT: 5 % (ref 0–5)
GFR SERPL CREATININE-BSD FRML MDRD: >60 ML/MIN/1.73M2
GLUCOSE SERPL-MCNC: 117 MG/DL (ref 70–99)
HCT VFR BLD AUTO: 37.6 % (ref 41–53)
HDLC SERPL-MCNC: 60 MG/DL
HGB BLD-MCNC: 12.6 G/DL (ref 13.5–17.5)
LDLC SERPL CALC-MCNC: 50 MG/DL (ref 0–130)
LYMPHOCYTES # BLD: 23 % (ref 13–44)
MCH RBC QN AUTO: 33.5 PG (ref 26–34)
MCHC RBC AUTO-ENTMCNC: 33.4 G/DL (ref 31–37)
MCV RBC AUTO: 100.3 FL (ref 80–100)
MONOCYTES # BLD: 10 % (ref 5–9)
PATIENT FASTING?: YES
PDW BLD-RTO: 15.7 % (ref 12.1–15.2)
PLATELET # BLD AUTO: 132 K/UL (ref 140–450)
POTASSIUM SERPL-SCNC: 4.9 MMOL/L (ref 3.7–5.3)
PROT SERPL-MCNC: 6.7 G/DL (ref 6.4–8.3)
RBC # BLD: 3.75 M/UL (ref 4.5–5.9)
SEG NEUTROPHILS: 61 % (ref 39–75)
SEGMENTED NEUTROPHILS ABSOLUTE COUNT: 2.3 K/UL (ref 2.1–6.5)
SODIUM SERPL-SCNC: 138 MMOL/L (ref 135–144)
TRIGL SERPL-MCNC: 68 MG/DL
WBC # BLD AUTO: 3.7 K/UL (ref 3.5–11)

## 2023-04-24 PROCEDURE — 93010 ELECTROCARDIOGRAM REPORT: CPT | Performed by: INTERNAL MEDICINE

## 2023-04-24 PROCEDURE — 36415 COLL VENOUS BLD VENIPUNCTURE: CPT

## 2023-04-24 PROCEDURE — 93005 ELECTROCARDIOGRAM TRACING: CPT

## 2023-04-24 PROCEDURE — 80061 LIPID PANEL: CPT

## 2023-04-24 PROCEDURE — 80053 COMPREHEN METABOLIC PANEL: CPT

## 2023-04-24 PROCEDURE — 85025 COMPLETE CBC W/AUTO DIFF WBC: CPT

## 2023-04-28 DIAGNOSIS — R40.0 DAYTIME SOMNOLENCE: Primary | ICD-10-CM

## 2023-04-28 DIAGNOSIS — R06.02 SOB (SHORTNESS OF BREATH): ICD-10-CM

## 2023-05-03 ENCOUNTER — OFFICE VISIT (OUTPATIENT)
Dept: CARDIOLOGY CLINIC | Age: 68
End: 2023-05-03
Payer: MEDICARE

## 2023-05-03 DIAGNOSIS — R07.9 CHEST PAIN, UNSPECIFIED TYPE: ICD-10-CM

## 2023-05-03 DIAGNOSIS — R06.02 SOB (SHORTNESS OF BREATH): Primary | ICD-10-CM

## 2023-05-03 DIAGNOSIS — I48.91 ATRIAL FIBRILLATION, UNSPECIFIED TYPE (HCC): ICD-10-CM

## 2023-05-03 PROCEDURE — 99214 OFFICE O/P EST MOD 30 MIN: CPT | Performed by: INTERNAL MEDICINE

## 2023-05-03 RX ORDER — METOPROLOL SUCCINATE 25 MG/1
25 TABLET, EXTENDED RELEASE ORAL DAILY
Qty: 30 TABLET | Refills: 11 | Status: SHIPPED | OUTPATIENT
Start: 2023-05-03 | End: 2023-05-09 | Stop reason: ALTCHOICE

## 2023-05-03 RX ORDER — SPIRONOLACTONE 25 MG/1
25 TABLET ORAL DAILY
COMMUNITY
End: 2023-05-03 | Stop reason: SDUPTHER

## 2023-05-03 RX ORDER — METOPROLOL SUCCINATE 25 MG/1
25 TABLET, EXTENDED RELEASE ORAL DAILY
COMMUNITY
End: 2023-05-03 | Stop reason: SDUPTHER

## 2023-05-03 RX ORDER — SPIRONOLACTONE 25 MG/1
25 TABLET ORAL DAILY
Qty: 30 TABLET | Refills: 11 | Status: SHIPPED | OUTPATIENT
Start: 2023-05-03

## 2023-05-03 RX ORDER — SACUBITRIL AND VALSARTAN 24; 26 MG/1; MG/1
1 TABLET, FILM COATED ORAL 2 TIMES DAILY
COMMUNITY
End: 2023-05-03 | Stop reason: SDUPTHER

## 2023-05-03 RX ORDER — SACUBITRIL AND VALSARTAN 24; 26 MG/1; MG/1
1 TABLET, FILM COATED ORAL 2 TIMES DAILY
Qty: 60 TABLET | Refills: 11 | Status: SHIPPED | OUTPATIENT
Start: 2023-05-03

## 2023-05-08 ENCOUNTER — HOSPITAL ENCOUNTER (OUTPATIENT)
Age: 68
Discharge: HOME | End: 2023-05-08
Payer: OTHER GOVERNMENT

## 2023-05-08 ENCOUNTER — HOSPITAL ENCOUNTER (OUTPATIENT)
Dept: PULMONOLOGY | Age: 68
Discharge: HOME | End: 2023-05-08
Payer: OTHER GOVERNMENT

## 2023-05-08 ENCOUNTER — TELEPHONE (OUTPATIENT)
Dept: CARDIOLOGY CLINIC | Age: 68
End: 2023-05-08

## 2023-05-08 VITALS — OXYGEN SATURATION: 99 %

## 2023-05-08 DIAGNOSIS — R06.02 SOB (SHORTNESS OF BREATH): ICD-10-CM

## 2023-05-08 DIAGNOSIS — R40.0 DAYTIME SOMNOLENCE: ICD-10-CM

## 2023-05-08 DIAGNOSIS — R07.9 CHEST PAIN, UNSPECIFIED TYPE: ICD-10-CM

## 2023-05-08 DIAGNOSIS — Z79.899 LONG TERM CURRENT USE OF ANTIARRHYTHMIC MEDICAL THERAPY: ICD-10-CM

## 2023-05-08 DIAGNOSIS — I48.91 ATRIAL FIBRILLATION, UNSPECIFIED TYPE (HCC): ICD-10-CM

## 2023-05-08 DIAGNOSIS — R07.9 CHEST PAIN, UNSPECIFIED TYPE: Primary | ICD-10-CM

## 2023-05-08 DIAGNOSIS — R78.89 ELEVATED DIGOXIN LEVEL: ICD-10-CM

## 2023-05-08 LAB
ALBUMIN SERPL-MCNC: 3.8 G/DL (ref 3.5–5.2)
ALP SERPL-CCNC: 91 U/L (ref 40–129)
ALT SERPL-CCNC: <5 U/L (ref 5–41)
ANION GAP SERPL CALCULATED.3IONS-SCNC: 8 MMOL/L (ref 9–17)
AST SERPL-CCNC: 14 U/L
BILIRUB SERPL-MCNC: 1.1 MG/DL (ref 0.3–1.2)
BUN SERPL-MCNC: 16 MG/DL (ref 8–23)
BUN/CREAT BLD: 13 (ref 9–20)
CALCIUM SERPL-MCNC: 8.7 MG/DL (ref 8.6–10.4)
CHLORIDE SERPL-SCNC: 95 MMOL/L (ref 98–107)
CO2 SERPL-SCNC: 27 MMOL/L (ref 20–31)
CREAT SERPL-MCNC: 1.27 MG/DL (ref 0.7–1.2)
DIGOXIN DATE LAST DOSE: ABNORMAL
DIGOXIN DOSE AMOUNT: ABNORMAL
DIGOXIN DOSE TIME: ABNORMAL
DIGOXIN LEVEL: 2.5 NG/ML (ref 0.5–2)
GFR SERPL CREATININE-BSD FRML MDRD: >60 ML/MIN/1.73M2
GLUCOSE SERPL-MCNC: 142 MG/DL (ref 70–99)
POTASSIUM SERPL-SCNC: 4.7 MMOL/L (ref 3.7–5.3)
PROT SERPL-MCNC: 6.6 G/DL (ref 6.4–8.3)
SODIUM SERPL-SCNC: 130 MMOL/L (ref 135–144)
TSH SERPL-ACNC: 4.2 UIU/ML (ref 0.3–5)

## 2023-05-08 PROCEDURE — 6360000002 HC RX W HCPCS: Performed by: INTERNAL MEDICINE

## 2023-05-08 PROCEDURE — 94060 EVALUATION OF WHEEZING: CPT

## 2023-05-08 PROCEDURE — 84443 ASSAY THYROID STIM HORMONE: CPT

## 2023-05-08 PROCEDURE — 94726 PLETHYSMOGRAPHY LUNG VOLUMES: CPT

## 2023-05-08 PROCEDURE — 80053 COMPREHEN METABOLIC PANEL: CPT

## 2023-05-08 PROCEDURE — 36415 COLL VENOUS BLD VENIPUNCTURE: CPT

## 2023-05-08 PROCEDURE — 80162 ASSAY OF DIGOXIN TOTAL: CPT

## 2023-05-08 PROCEDURE — 94729 DIFFUSING CAPACITY: CPT

## 2023-05-08 RX ORDER — DIGOXIN 125 MCG
TABLET ORAL
Qty: 30 TABLET | Refills: 11
Start: 2023-05-08

## 2023-05-08 RX ORDER — ALBUTEROL SULFATE 2.5 MG/3ML
2.5 SOLUTION RESPIRATORY (INHALATION) ONCE
Status: COMPLETED | OUTPATIENT
Start: 2023-05-08 | End: 2023-05-08

## 2023-05-08 RX ADMIN — ALBUTEROL SULFATE 2.5 MG: 2.5 SOLUTION RESPIRATORY (INHALATION) at 13:11

## 2023-05-08 NOTE — TELEPHONE ENCOUNTER
Pt called with dig level after being reviewed per DR Jillian Ashley   Will take digoxin 0.125 2 days a week Wed and Sunday   And 0.25 daily.  Will repeat dig level  and cmp on 5/23

## 2023-05-09 ENCOUNTER — TELEPHONE (OUTPATIENT)
Dept: CARDIOLOGY CLINIC | Age: 68
End: 2023-05-09

## 2023-05-09 NOTE — PROCEDURES
[place] [address]                               PULMONARY FUNCTION    PATIENT NAME: Augustin Lemons                   :        1955  MED REC NO:   [id number]                              ROOM:  ACCOUNT NO:   [de-identified]                           ADMIT DATE: 2023  PROVIDER:     Halina Marcelino MD    DATE OF PROCEDURE:  2023    PULMONARY FUNCTION TESTS WITH BRONCHODILATOR AND DLCO    ATTENDING PHYSICIAN:  Enma Wells MD    REASON FOR TEST:  Shortness of breath. SUMMARY:  1. The patient's effort was reported as good. 2.  The forced vital capacity is decreased at 69% of predicted. 3.  The forced exhaled volume in 1 second is decreased at 66% of  predicted. 4.  The forced exhaled volume in 1 second/forced vital capacity is at  94% of predicted. 5.  The forced exhaled flow at 25-75% is decreased at 59% of predicted. 6.  A significant improvement was not seen in the forced exhaled volume  in 1 second, forced vital capacity, or forced exhaled flow at 25-75%  after one-time dose of bronchodilator. 7.  Total lung capacity is decreased at 73% of predicted. 8.  The DLCO is decreased at 60% of predicted, the DLCO/VA is at 75% of  predicted. IMPRESSION:  1.  Pulmonary function tests and flow volume loop suggest a combined  moderate obstructive pulmonary disease (emphysema) with a mild  restrictive lung disease. 2.  No significant improvement was seen after one-time dose of  bronchodilator. 3.  The DLCO is moderately decreased.         Michel Solis MD    D: 2023 7:46:31       T: 2023 11:06:58     [id number]  Job#: [id number]     Doc#: [id number]    CC:  Enma Wells MD

## 2023-05-09 NOTE — TELEPHONE ENCOUNTER
Pt called c/o since starting metoprolol he is vomiting and c/o headache did that last time he took It  Dr informed to back on the coreg.

## 2023-05-11 ENCOUNTER — TELEPHONE (OUTPATIENT)
Dept: CARDIOLOGY CLINIC | Age: 68
End: 2023-05-11

## 2023-05-17 NOTE — PROGRESS NOTES
Lisandra Delong MD  [facility]  [address], [place] [address]  [phone]      May 3, 2023      Noemi Sweet MD  [facility], [address] [facility]      RE:   Renae Albrecht  :  1955      Dear Dr. Svitlana Vasquez:    CHIEF COMPLAINT:  1. Shortness of breath. 2.  Chest pain. HISTORY OF PRESENT ILLNESS:  I met with Mr. Anson Maldonado and his wife in our office on 2023. I trust you received my full H and P from 2023, when he developed increasing shortness of breath with exertion along with some arm discomfort. We did a Lexiscan Cardiolite stress test on 2023, that showed anteroapical, inferoapical abnormality in both stress and rest images consistent with either infarct or diaphragmatic artifact. Because of his symptoms, we decided to proceed with a cardiac catheterization. He was having some rapid ventricular response from his atrial fibrillation and we increased his digoxin from 0.25 mg daily to 0.25 mg daily along with 0.125 mg 3 days a week. The catheterization was done on 2023. It showed mild coronary artery disease. He did have severe pulmonary hypertension with a PA pressure of 70/30. His EF was 35% to 40%, and we recommended medical therapy. I repeated an echocardiogram at [place] [address] on that same day on . This showed an ejection fraction of 31%. They also calculated a pulmonary artery pressure of 75 mmHg. He had mild aortic stenosis with no significant mitral stenosis or regurgitation. We recommended medical therapy with a workup for pulmonary hypertension. Again, I met with his wife, Gee Black, and him on . They were somewhat upset that I had not found any significant findings on his cardiac catheterization. I went over in detail his cardiac catheterization.   I showed him his mild coronary artery disease showing that there was no significant area of

## 2024-12-17 ENCOUNTER — HOSPITAL ENCOUNTER (EMERGENCY)
Age: 69
Discharge: HOME | End: 2024-12-17
Payer: OTHER GOVERNMENT

## 2024-12-17 VITALS
DIASTOLIC BLOOD PRESSURE: 91 MMHG | OXYGEN SATURATION: 97 % | SYSTOLIC BLOOD PRESSURE: 127 MMHG | TEMPERATURE: 98.9 F | HEART RATE: 88 BPM

## 2024-12-17 VITALS — BODY MASS INDEX: 34.3 KG/M2

## 2024-12-17 DIAGNOSIS — T81.30XA: Primary | ICD-10-CM

## 2024-12-17 PROCEDURE — 87070 CULTURE OTHR SPECIMN AEROBIC: CPT

## 2024-12-17 PROCEDURE — 87075 CULTR BACTERIA EXCEPT BLOOD: CPT

## 2024-12-17 PROCEDURE — 99283 EMERGENCY DEPT VISIT LOW MDM: CPT

## 2024-12-17 NOTE — ED_ITS
HPI    
HPI - General Adult    
General    
Chief complaint: Skin/Abscess/Foreign Body    
Stated complaint: wound check    
Time Seen by Provider: 12/17/24 12:03    
History of Present Illness    
HPI narrative:     
69-year-old male presents for an issue with his surgical wound.  At the end of   
November [facility] and have the sutures taken   
out a few days ago.  Subsequently it seems to have opened and he has had some   
bloody drainage.  He has not had a fever.    
Related Data    
                                  Previous Rx's    
    
    
    
?Medication ?Instructions ?Recorded    
     
cephalexin 500 mg capsule 500 mg PO QID 10 days #40 caps 12/17/24    
    
    
    
                                    Allergies    
    
    
    
Allergy/AdvReac Type Severity Reaction Status Date / Time    
     
sulfamethoxazole (From Allergy Severe Unknown Verified 12/17/24 12:09    
    
Bactrim)         
     
trimethoprim (From Bactrim) Allergy Severe Unknown Verified 12/17/24 12:09    
    
    
    
    
Opioid HPI    
Opioid Management    
Most Recent Opioid Data:     
    
    
                                        No Data to Display    
    
    
    
Review of Systems    
    
    
ROS      
    
 Narrative A ten point review of systems is negative except as noted above.       
    
    
PFSH    
PFSH    
Social History    
Little interest or pleasure in doing things:  not at all     
Feeling down, depressed, or hopeless:  not at all     
    
    
    
Exam    
Narrative    
Exam Narrative:     
Nurses note and vital signs reviewed and patient is not hypoxic.    
    
General:  The patient appears well and in no apparent distress.  Patient is   
resting comfortably on cart.    
Skin:  Warm, dry, no pallor noted.  There is no rash noted.  He has a football   
shaped open area on his back which is approximately 2 inches long.  There is   
some purulent material present.  There is no abscess    
Head:  Normocephalic, atraumatic    
Eye: Normal conjunctiva, no drainage    
Ears, Nose, Mouth, and Throat: oral mucosa is moist. Nares patent.     
Cardiovascular:  Regular Rate and Rhythm    
Respiratory:  Patient is in no distress, no accessory muscle use, lungs are   
clear to auscultation, no wheezing, rales or rhonchi    
Back:  non-tender, no CVA tenderness bilaterally to percussion.    
GI: Soft and    
Musculoskeletal: The patient has no evidence of calf tenderness, no pitting   
edema, symmetrical pulses noted bilaterally    
Neurological:  A&O, normal speech    
Psychiatric:  Cooperative    
Constitutional    
Vital Signs, click to edit/add:     
    
                                Last Vital Signs    
    
    
    
Temp  98.9 F   12/17/24 12:04    
     
Pulse  88   12/17/24 12:04    
     
Resp  16   12/17/24 12:04    
     
BP  127/91   12/17/24 12:04    
     
Pulse Ox  97   12/17/24 12:04    
     
O2 Del Method  Room Air  12/17/24 12:04    
    
    
    
    
    
Course    
Vital Signs    
Vital signs:     
    
                                   Vital Signs    
    
    
    
Temperature  98.9 F   12/17/24 12:04    
     
Pulse Rate  88   12/17/24 12:04    
     
Respiratory Rate  16   12/17/24 12:04    
     
Blood Pressure  127/91   12/17/24 12:04    
     
Pulse Oximetry  97   12/17/24 12:04    
     
Oxygen Delivery Method  Room Air  12/17/24 12:04    
    
    
                                            
    
    
    
Temperature  98.9 F   12/17/24 12:04    
     
Pulse Rate  88   12/17/24 12:04    
     
Respiratory Rate  16   12/17/24 12:04    
     
Blood Pressure  127/91   12/17/24 12:04    
     
Pulse Oximetry  97   12/17/24 12:04    
     
Oxygen Delivery Method  Room Air  12/17/24 12:04    
    
    
    
    
    
Medical Decision Making    
MDM Narrative    
Medical decision making narrative:     
Wound culture is taken and he is placed on Keflex and will follow-up with his   
specialist.  Treatment diagnosis and follow-up were discussed with the patient.    
Differential Diagnosis    
Differential Diagnosis: Wound dehiscence, wound infection    
    
Discharge Plan    
Discharge    
Chief Complaint: Skin/Abscess/Foreign Body    
    
Clinical Impression:    
 Wound dehiscence    
    
    
Patient Disposition: Home, Self-Care    
    
Time of Disposition Decision: 12:11    
    
Condition: Good    
    
Mode of Transportation: Private Vehicle    
    
Prescriptions / Home Meds:    
New    
  cephalexin 500 mg capsule     
   500 mg PO QID 10 Days Qty: 40 0RF    
    
Print Language: English    
    
Instructions:  Wound Infection (ED), Wound Dehiscence (ED)    
    
Additional Instructions:    
See the specialist to did the procedure in a week.

## 2024-12-17 NOTE — [email or web address]
HPI
HPI - General Adult
General
Chief complaint: Skin/Abscess/Foreign Body
Stated complaint: wound check
Time Seen by Provider: 12/17/24 12:03
History of Present Illness
HPI narrative: 
69-year-old male presents for an issue with his surgical wound.  At the end of November [facility] and have the sutures taken out a few days ago.  Subsequently it seems to have opened and he has had some bloody 
drainage.  He has not had a fever.
Related Data
Previous Rx's

?Medication ?Instructions ?Recorded
cephalexin 500 mg capsule 500 mg PO QID 10 days #40 caps 12/17/24


Allergies

Allergy/AdvReac Type Severity Reaction Status Date / Time
sulfamethoxazole (From Allergy Severe Unknown Verified 12/17/24 12:09
Bactrim)     
trimethoprim (From Bactrim) Allergy Severe Unknown Verified 12/17/24 12:09



Opioid HPI
Opioid Management
Most Recent Opioid Data: 
      No Data to Display


Review of Systems
ROS  
 Narrative A ten point review of systems is negative except as noted above.   

PFSH
PFSH
Social History
Little interest or pleasure in doing things:  not at all 
Feeling down, depressed, or hopeless:  not at all 



Exam
Narrative
Exam Narrative: 
Nurses note and vital signs reviewed and patient is not hypoxic.

General:  The patient appears well and in no apparent distress.  Patient is resting comfortably on cart.
Skin:  Warm, dry, no pallor noted.  There is no rash noted.  He has a football shaped open area on his back which is approximately 2 inches long.  There is some purulent material present.  There is no abscess
Head:  Normocephalic, atraumatic
Eye: Normal conjunctiva, no drainage
Ears, Nose, Mouth, and Throat: oral mucosa is moist. Nares patent. 
Cardiovascular:  Regular Rate and Rhythm
Respiratory:  Patient is in no distress, no accessory muscle use, lungs are clear to auscultation, no wheezing, rales or rhonchi
Back:  non-tender, no CVA tenderness bilaterally to percussion.
GI: Soft and
Musculoskeletal: The patient has no evidence of calf tenderness, no pitting edema, symmetrical pulses noted bilaterally
Neurological:  A&O, normal speech
Psychiatric:  Cooperative
Constitutional
Vital Signs, click to edit/add: 

Last Vital Signs

Temp  98.9 F   12/17/24 12:04
Pulse  88   12/17/24 12:04
Resp  16   12/17/24 12:04
BP  127/91   12/17/24 12:04
Pulse Ox  97   12/17/24 12:04
O2 Del Method  Room Air  12/17/24 12:04




Course
Vital Signs
Vital signs: 

Vital Signs

Temperature  98.9 F   12/17/24 12:04
Pulse Rate  88   12/17/24 12:04
Respiratory Rate  16   12/17/24 12:04
Blood Pressure  127/91   12/17/24 12:04
Pulse Oximetry  97   12/17/24 12:04
Oxygen Delivery Method  Room Air  12/17/24 12:04



Temperature  98.9 F   12/17/24 12:04
Pulse Rate  88   12/17/24 12:04
Respiratory Rate  16   12/17/24 12:04
Blood Pressure  127/91   12/17/24 12:04
Pulse Oximetry  97   12/17/24 12:04
Oxygen Delivery Method  Room Air  12/17/24 12:04




Medical Decision Making
MDM Narrative
Medical decision making narrative: 
Wound culture is taken and he is placed on Keflex and will follow-up with his specialist.  Treatment diagnosis and follow-up were discussed with the patient.
Differential Diagnosis
Differential Diagnosis: Wound dehiscence, wound infection

Discharge Plan
Discharge
Chief Complaint: Skin/Abscess/Foreign Body

Clinical Impression:
 Wound dehiscence


Patient Disposition: Home, Self-Care

Time of Disposition Decision: 12:11

Condition: Good

Mode of Transportation: Private Vehicle

Prescriptions / Home Meds:
New
  cephalexin 500 mg capsule 
   500 mg PO QID 10 Days Qty: 40 0RF

Print Language: English

Instructions:  Wound Infection (ED), Wound Dehiscence (ED)

Additional Instructions:
See the specialist to did the procedure in a week.

## (undated) DEVICE — 3M™ STERI-STRIP™ COMPOUND BENZOIN TINCTURE 40 BAGS/CARTON 4 CARTONS/CASE C1544: Brand: 3M™ STERI-STRIP™

## (undated) DEVICE — GLOVE SURG SZ 85 L12IN FNGR THK87MIL WHT LTX FREE

## (undated) DEVICE — SYRINGE 20ML LL S/C 50

## (undated) DEVICE — 35 ML SYRINGE LUER-LOCK TIP: Brand: MONOJECT

## (undated) DEVICE — GOWN,AURORA,NONRNF,XL,30/CS: Brand: MEDLINE

## (undated) DEVICE — 3M™ TEGADERM™ +PAD FILM DRESSING WITH NON-ADHERENT PAD, 3586, 3-1/2 IN X 4 IN (9 CM X 10 CM), 25/CAR, 4 CAR/CS: Brand: 3M™ TEGADERM™

## (undated) DEVICE — SOLUTION IV 1000ML 0.9% SOD CHL PH 5 INJ USP VIAFLX PLAS

## (undated) DEVICE — SUTURE VCRL SZ 4-0 L27IN ABSRB UD L19MM FS-2 3/8 CIR REV J422H

## (undated) DEVICE — SLING ORTH L ARM FASHION

## (undated) DEVICE — YANKAUER OPEN TIP WITH ON/OFF SWITCH: Brand: ARGYLE

## (undated) DEVICE — 3M™ STERI-STRIP™ REINFORCED ADHESIVE SKIN CLOSURES, R1547, 1/2 IN X 4 IN (12 MM X 100 MM), 6 STRIPS/ENVELOPE: Brand: 3M™ STERI-STRIP™

## (undated) DEVICE — ELECTRODE ES AD CRD L15FT DISP FOR PT BELOW 30LB REM

## (undated) DEVICE — NDLCTR: FOAM/MAG 40CT 64/CS: Brand: MEDICAL ACTION INDUSTRIES

## (undated) DEVICE — DRAPE,UTILTY,TAPE,15X26, 4EA/PK: Brand: MEDLINE

## (undated) DEVICE — INTRODUCER SHTH 7FR L13CM NDL 18GA GWIRE 0.035IN SYR VLV

## (undated) DEVICE — SUTURE PERMAHAND SZ 0 L30IN NONABSORBABLE BLK L26MM SH 1/2 K834H

## (undated) DEVICE — SYRINGE BULB 50/CS 48/PLT: Brand: MEDEGEN MEDICAL PRODUCTS, LLC

## (undated) DEVICE — SKIN MARKER,REGULAR TIP WITH RULER: Brand: DEVON

## (undated) DEVICE — COVER US PRB W15XL120CM W/ GEL RUBBERBAND TAPE STRP FLD GEN

## (undated) DEVICE — INTENDED FOR TISSUE SEPARATION, AND OTHER PROCEDURES THAT REQUIRE A SHARP SURGICAL BLADE TO PUNCTURE OR CUT.: Brand: BARD-PARKER ® CARBON RIB-BACK BLADES

## (undated) DEVICE — TIP ELECSURG BOVIE

## (undated) DEVICE — SUTURE VCRL SZ 2-0 L27IN ABSRB UD L26MM CT-2 1/2 CIR J269H

## (undated) DEVICE — DUP USE 291573 ELECTRODE PACING/ECG-ADLT

## (undated) DEVICE — NEEDLE HYPO 25GA L1.5IN BLU POLYPR HUB S STL REG BVL STR

## (undated) DEVICE — DBD-PACK,LAPAROTOMY,2 REINFORCED GOWNS: Brand: MEDLINE

## (undated) DEVICE — DECANTER FLD 9IN ST BG FOR ASEP TRNSF OF FLD

## (undated) DEVICE — MEDI-VAC NON-CONDUCTIVE SUCTION TUBING 6MM X 6.1M (20 FT.) L: Brand: CARDINAL HEALTH

## (undated) DEVICE — SOLUTION IV 500ML 0.9% SOD CHL PH 5 INJ USP VIAFLX PLAS

## (undated) DEVICE — CUSTOM PACK: Brand: UNBRANDED

## (undated) DEVICE — GAUZE,SPONGE,4"X4",16PLY,XRAY,STRL,LF: Brand: MEDLINE

## (undated) DEVICE — CHLORAPREP 26ML ORANGE

## (undated) DEVICE — DEVICE VASC CLSR 24CM FEM OR RAD ART EXT MECH PRSS DSG POST

## (undated) DEVICE — TOWEL,OR,DSP,ST,BLUE,STD,4/PK,20PK/CS: Brand: MEDLINE